# Patient Record
Sex: MALE | Race: WHITE | NOT HISPANIC OR LATINO | Employment: OTHER | URBAN - METROPOLITAN AREA
[De-identification: names, ages, dates, MRNs, and addresses within clinical notes are randomized per-mention and may not be internally consistent; named-entity substitution may affect disease eponyms.]

---

## 2021-04-13 PROBLEM — H90.3 SENSORINEURAL HEARING LOSS (SNHL) OF BOTH EARS: Status: ACTIVE | Noted: 2021-04-13

## 2021-04-13 PROBLEM — H61.22 CERUMEN DEBRIS ON TYMPANIC MEMBRANE OF LEFT EAR: Status: ACTIVE | Noted: 2021-04-13

## 2021-07-29 ENCOUNTER — APPOINTMENT (EMERGENCY)
Dept: RADIOLOGY | Facility: HOSPITAL | Age: 66
DRG: 246 | End: 2021-07-29
Payer: MEDICARE

## 2021-07-29 ENCOUNTER — APPOINTMENT (INPATIENT)
Dept: NON INVASIVE DIAGNOSTICS | Facility: HOSPITAL | Age: 66
DRG: 246 | End: 2021-07-29
Payer: MEDICARE

## 2021-07-29 ENCOUNTER — APPOINTMENT (INPATIENT)
Dept: RADIOLOGY | Facility: HOSPITAL | Age: 66
DRG: 246 | End: 2021-07-29
Payer: MEDICARE

## 2021-07-29 ENCOUNTER — HOSPITAL ENCOUNTER (INPATIENT)
Facility: HOSPITAL | Age: 66
LOS: 5 days | Discharge: HOME | DRG: 246 | End: 2021-08-03
Attending: EMERGENCY MEDICINE | Admitting: EMERGENCY MEDICINE
Payer: MEDICARE

## 2021-07-29 DIAGNOSIS — I48.0 PAROXYSMAL ATRIAL FIBRILLATION (HCC): ICD-10-CM

## 2021-07-29 DIAGNOSIS — I46.9 CARDIAC ARREST (HCC): Primary | ICD-10-CM

## 2021-07-29 LAB
ALBUMIN SERPL BCP-MCNC: 3.6 G/DL (ref 3.5–5)
ALBUMIN SERPL BCP-MCNC: 3.7 G/DL (ref 3.5–5)
ALP SERPL-CCNC: 70 U/L (ref 46–116)
ALP SERPL-CCNC: 72 U/L (ref 46–116)
ALT SERPL W P-5'-P-CCNC: 77 U/L (ref 12–78)
ALT SERPL W P-5'-P-CCNC: 86 U/L (ref 12–78)
ANION GAP SERPL CALCULATED.3IONS-SCNC: 11 MMOL/L (ref 4–13)
ANION GAP SERPL CALCULATED.3IONS-SCNC: 9 MMOL/L (ref 4–13)
APTT PPP: 24 SECONDS (ref 23–37)
APTT PPP: 27 SECONDS (ref 23–37)
AST SERPL W P-5'-P-CCNC: 55 U/L (ref 5–45)
AST SERPL W P-5'-P-CCNC: 75 U/L (ref 5–45)
ATRIAL RATE: 153 BPM
BASOPHILS # BLD AUTO: 0.05 THOUSANDS/ΜL (ref 0–0.1)
BASOPHILS NFR BLD AUTO: 1 % (ref 0–1)
BILIRUB SERPL-MCNC: 0.47 MG/DL (ref 0.2–1)
BILIRUB SERPL-MCNC: 0.52 MG/DL (ref 0.2–1)
BUN SERPL-MCNC: 22 MG/DL (ref 5–25)
BUN SERPL-MCNC: 25 MG/DL (ref 5–25)
CA-I BLD-SCNC: 1.17 MMOL/L (ref 1.12–1.32)
CALCIUM SERPL-MCNC: 9 MG/DL (ref 8.3–10.1)
CALCIUM SERPL-MCNC: 9.2 MG/DL (ref 8.3–10.1)
CHLORIDE SERPL-SCNC: 104 MMOL/L (ref 100–108)
CHLORIDE SERPL-SCNC: 105 MMOL/L (ref 100–108)
CHOLEST SERPL-MCNC: 156 MG/DL (ref 50–200)
CO2 SERPL-SCNC: 25 MMOL/L (ref 21–32)
CO2 SERPL-SCNC: 25 MMOL/L (ref 21–32)
CREAT SERPL-MCNC: 1.27 MG/DL (ref 0.6–1.3)
CREAT SERPL-MCNC: 1.53 MG/DL (ref 0.6–1.3)
EOSINOPHIL # BLD AUTO: 0.23 THOUSAND/ΜL (ref 0–0.61)
EOSINOPHIL NFR BLD AUTO: 2 % (ref 0–6)
ERYTHROCYTE [DISTWIDTH] IN BLOOD BY AUTOMATED COUNT: 13.3 % (ref 11.6–15.1)
EST. AVERAGE GLUCOSE BLD GHB EST-MCNC: 171 MG/DL
GFR SERPL CREATININE-BSD FRML MDRD: 46 ML/MIN/1.73SQ M
GFR SERPL CREATININE-BSD FRML MDRD: 58 ML/MIN/1.73SQ M
GLUCOSE SERPL-MCNC: 198 MG/DL (ref 65–140)
GLUCOSE SERPL-MCNC: 246 MG/DL (ref 65–140)
HBA1C MFR BLD: 7.6 %
HCT VFR BLD AUTO: 40 % (ref 36.5–49.3)
HDLC SERPL-MCNC: 36 MG/DL
HGB BLD-MCNC: 13.1 G/DL (ref 12–17)
IMM GRANULOCYTES # BLD AUTO: 0.05 THOUSAND/UL (ref 0–0.2)
IMM GRANULOCYTES NFR BLD AUTO: 1 % (ref 0–2)
INR PPP: 0.96 (ref 0.84–1.19)
INR PPP: 1.04 (ref 0.84–1.19)
LACTATE SERPL-SCNC: 2.9 MMOL/L (ref 0.5–2)
LACTATE SERPL-SCNC: 3.4 MMOL/L (ref 0.5–2)
LACTATE SERPL-SCNC: 5.1 MMOL/L (ref 0.5–2)
LDLC SERPL DIRECT ASSAY-MCNC: 75 MG/DL (ref 0–100)
LYMPHOCYTES # BLD AUTO: 3.44 THOUSANDS/ΜL (ref 0.6–4.47)
LYMPHOCYTES NFR BLD AUTO: 35 % (ref 14–44)
MAGNESIUM SERPL-MCNC: 2.1 MG/DL (ref 1.6–2.6)
MAGNESIUM SERPL-MCNC: 2.1 MG/DL (ref 1.6–2.6)
MCH RBC QN AUTO: 28.4 PG (ref 26.8–34.3)
MCHC RBC AUTO-ENTMCNC: 32.8 G/DL (ref 31.4–37.4)
MCV RBC AUTO: 87 FL (ref 82–98)
MONOCYTES # BLD AUTO: 0.97 THOUSAND/ΜL (ref 0.17–1.22)
MONOCYTES NFR BLD AUTO: 10 % (ref 4–12)
NEUTROPHILS # BLD AUTO: 5.24 THOUSANDS/ΜL (ref 1.85–7.62)
NEUTS SEG NFR BLD AUTO: 51 % (ref 43–75)
NRBC BLD AUTO-RTO: 0 /100 WBCS
PHOSPHATE SERPL-MCNC: 3.4 MG/DL (ref 2.3–4.1)
PLATELET # BLD AUTO: 262 THOUSANDS/UL (ref 149–390)
PMV BLD AUTO: 9.7 FL (ref 8.9–12.7)
POTASSIUM SERPL-SCNC: 3.3 MMOL/L (ref 3.5–5.3)
POTASSIUM SERPL-SCNC: 4 MMOL/L (ref 3.5–5.3)
PROT SERPL-MCNC: 7 G/DL (ref 6.4–8.2)
PROT SERPL-MCNC: 7.2 G/DL (ref 6.4–8.2)
PROTHROMBIN TIME: 12.8 SECONDS (ref 11.6–14.5)
PROTHROMBIN TIME: 13.6 SECONDS (ref 11.6–14.5)
QRS AXIS: 56 DEGREES
QRSD INTERVAL: 92 MS
QT INTERVAL: 368 MS
QTC INTERVAL: 455 MS
RBC # BLD AUTO: 4.61 MILLION/UL (ref 3.88–5.62)
SODIUM SERPL-SCNC: 139 MMOL/L (ref 136–145)
SODIUM SERPL-SCNC: 140 MMOL/L (ref 136–145)
T WAVE AXIS: 34 DEGREES
TRIGL SERPL-MCNC: 410 MG/DL
TROPONIN I SERPL-MCNC: 0.05 NG/ML
TROPONIN I SERPL-MCNC: 0.09 NG/ML
TROPONIN I SERPL-MCNC: <0.02 NG/ML
TSH SERPL DL<=0.05 MIU/L-ACNC: 1.41 UIU/ML (ref 0.36–3.74)
VENTRICULAR RATE: 92 BPM
WBC # BLD AUTO: 9.98 THOUSAND/UL (ref 4.31–10.16)

## 2021-07-29 PROCEDURE — 71275 CT ANGIOGRAPHY CHEST: CPT

## 2021-07-29 PROCEDURE — 71045 X-RAY EXAM CHEST 1 VIEW: CPT

## 2021-07-29 PROCEDURE — 85730 THROMBOPLASTIN TIME PARTIAL: CPT | Performed by: NURSE PRACTITIONER

## 2021-07-29 PROCEDURE — 99291 CRITICAL CARE FIRST HOUR: CPT

## 2021-07-29 PROCEDURE — 83605 ASSAY OF LACTIC ACID: CPT | Performed by: EMERGENCY MEDICINE

## 2021-07-29 PROCEDURE — 84443 ASSAY THYROID STIM HORMONE: CPT | Performed by: PHYSICIAN ASSISTANT

## 2021-07-29 PROCEDURE — 83735 ASSAY OF MAGNESIUM: CPT | Performed by: PHYSICIAN ASSISTANT

## 2021-07-29 PROCEDURE — 85025 COMPLETE CBC W/AUTO DIFF WBC: CPT | Performed by: EMERGENCY MEDICINE

## 2021-07-29 PROCEDURE — 99223 1ST HOSP IP/OBS HIGH 75: CPT | Performed by: INTERNAL MEDICINE

## 2021-07-29 PROCEDURE — 84484 ASSAY OF TROPONIN QUANT: CPT | Performed by: EMERGENCY MEDICINE

## 2021-07-29 PROCEDURE — 85610 PROTHROMBIN TIME: CPT | Performed by: EMERGENCY MEDICINE

## 2021-07-29 PROCEDURE — 99291 CRITICAL CARE FIRST HOUR: CPT | Performed by: EMERGENCY MEDICINE

## 2021-07-29 PROCEDURE — 83735 ASSAY OF MAGNESIUM: CPT | Performed by: NURSE PRACTITIONER

## 2021-07-29 PROCEDURE — 99291 CRITICAL CARE FIRST HOUR: CPT | Performed by: PHYSICIAN ASSISTANT

## 2021-07-29 PROCEDURE — 85730 THROMBOPLASTIN TIME PARTIAL: CPT | Performed by: EMERGENCY MEDICINE

## 2021-07-29 PROCEDURE — 83721 ASSAY OF BLOOD LIPOPROTEIN: CPT | Performed by: NURSE PRACTITIONER

## 2021-07-29 PROCEDURE — 80053 COMPREHEN METABOLIC PANEL: CPT | Performed by: EMERGENCY MEDICINE

## 2021-07-29 PROCEDURE — 84484 ASSAY OF TROPONIN QUANT: CPT | Performed by: NURSE PRACTITIONER

## 2021-07-29 PROCEDURE — 80053 COMPREHEN METABOLIC PANEL: CPT | Performed by: PHYSICIAN ASSISTANT

## 2021-07-29 PROCEDURE — 99292 CRITICAL CARE ADDL 30 MIN: CPT | Performed by: ANESTHESIOLOGY

## 2021-07-29 PROCEDURE — 1123F ACP DISCUSS/DSCN MKR DOCD: CPT | Performed by: INTERNAL MEDICINE

## 2021-07-29 PROCEDURE — 36415 COLL VENOUS BLD VENIPUNCTURE: CPT | Performed by: EMERGENCY MEDICINE

## 2021-07-29 PROCEDURE — 80061 LIPID PANEL: CPT | Performed by: NURSE PRACTITIONER

## 2021-07-29 PROCEDURE — 93306 TTE W/DOPPLER COMPLETE: CPT

## 2021-07-29 PROCEDURE — 84100 ASSAY OF PHOSPHORUS: CPT | Performed by: PHYSICIAN ASSISTANT

## 2021-07-29 PROCEDURE — 85610 PROTHROMBIN TIME: CPT | Performed by: NURSE PRACTITIONER

## 2021-07-29 PROCEDURE — 83036 HEMOGLOBIN GLYCOSYLATED A1C: CPT | Performed by: NURSE PRACTITIONER

## 2021-07-29 PROCEDURE — 93005 ELECTROCARDIOGRAM TRACING: CPT

## 2021-07-29 PROCEDURE — 82330 ASSAY OF CALCIUM: CPT | Performed by: PHYSICIAN ASSISTANT

## 2021-07-29 PROCEDURE — 93010 ELECTROCARDIOGRAM REPORT: CPT | Performed by: INTERNAL MEDICINE

## 2021-07-29 PROCEDURE — 83605 ASSAY OF LACTIC ACID: CPT | Performed by: PHYSICIAN ASSISTANT

## 2021-07-29 RX ORDER — PANTOPRAZOLE SODIUM 40 MG/1
40 TABLET, DELAYED RELEASE ORAL
Status: DISCONTINUED | OUTPATIENT
Start: 2021-07-30 | End: 2021-08-03 | Stop reason: HOSPADM

## 2021-07-29 RX ORDER — ASPIRIN 81 MG/1
81 TABLET ORAL DAILY
Status: DISCONTINUED | OUTPATIENT
Start: 2021-07-30 | End: 2021-07-30

## 2021-07-29 RX ORDER — LIDOCAINE 50 MG/G
1 PATCH TOPICAL DAILY
Status: DISCONTINUED | OUTPATIENT
Start: 2021-07-29 | End: 2021-08-03 | Stop reason: HOSPADM

## 2021-07-29 RX ORDER — LANOLIN ALCOHOL/MO/W.PET/CERES
6 CREAM (GRAM) TOPICAL
Status: DISCONTINUED | OUTPATIENT
Start: 2021-07-29 | End: 2021-08-03 | Stop reason: HOSPADM

## 2021-07-29 RX ORDER — POTASSIUM CHLORIDE 14.9 MG/ML
20 INJECTION INTRAVENOUS ONCE
Status: COMPLETED | OUTPATIENT
Start: 2021-07-29 | End: 2021-07-30

## 2021-07-29 RX ORDER — HEPARIN SODIUM 1000 [USP'U]/ML
2000 INJECTION, SOLUTION INTRAVENOUS; SUBCUTANEOUS
Status: DISCONTINUED | OUTPATIENT
Start: 2021-07-29 | End: 2021-07-30

## 2021-07-29 RX ORDER — POTASSIUM CHLORIDE 20 MEQ/1
20 TABLET, EXTENDED RELEASE ORAL ONCE
Status: COMPLETED | OUTPATIENT
Start: 2021-07-29 | End: 2021-07-29

## 2021-07-29 RX ORDER — LIDOCAINE 50 MG/G
1 PATCH TOPICAL DAILY
Status: DISCONTINUED | OUTPATIENT
Start: 2021-07-30 | End: 2021-07-29

## 2021-07-29 RX ORDER — HEPARIN SODIUM 10000 [USP'U]/100ML
3-20 INJECTION, SOLUTION INTRAVENOUS
Status: DISCONTINUED | OUTPATIENT
Start: 2021-07-29 | End: 2021-07-30

## 2021-07-29 RX ORDER — HEPARIN SODIUM 1000 [USP'U]/ML
4000 INJECTION, SOLUTION INTRAVENOUS; SUBCUTANEOUS
Status: DISCONTINUED | OUTPATIENT
Start: 2021-07-29 | End: 2021-07-30

## 2021-07-29 RX ADMIN — POTASSIUM CHLORIDE 20 MEQ: 1500 TABLET, EXTENDED RELEASE ORAL at 20:02

## 2021-07-29 RX ADMIN — Medication 12.5 MG: at 20:56

## 2021-07-29 RX ADMIN — HEPARIN SODIUM 11.1 UNITS/KG/HR: 10000 INJECTION, SOLUTION INTRAVENOUS at 19:02

## 2021-07-29 RX ADMIN — POTASSIUM CHLORIDE 20 MEQ: 14.9 INJECTION, SOLUTION INTRAVENOUS at 20:02

## 2021-07-29 RX ADMIN — IOHEXOL 85 ML: 350 INJECTION, SOLUTION INTRAVENOUS at 18:42

## 2021-07-29 RX ADMIN — LIDOCAINE 1 PATCH: 50 PATCH TOPICAL at 21:58

## 2021-07-29 NOTE — H&P
H&P Exam - Critical Care   Michael Ferris 77 y o  male MRN: [id number]  Unit/Bed#: ED 12 Encounter: [id number]      -------------------------------------------------------------------------------------------------------------  Chief Complaint: cardiac arrest     History of Present Illness   HX and PE limited by: none   Michael Ferris is a 77 y o  male with a medical history of hypertension hypercholesteremia and diabetes presenting status post cardiac arrest, patient is active and fit was at the fitness center on a rowing machine and became dizzy and then had a syncopal episode  Bystander which 1 of them was a medical professional did not find any pulses started CPR and had 4 rounds of CPR and AED was placed and patient was defibrillated he had ROSC  He was alert and speaking at that time, patient arrived in the ER and per ER note he was cool and clammy in gray continue complaining of lightheadedness  Patient was placed on nasal cannula 2 L, blood pressure was 129/66 with a pulse of 94 which was AFib  Cardiology was immediately consult, his labs were significant for low potassium, an ROSIBEL with a creatinine of 1 53, an elevated glucose at 249  Mildly elevated liver enzyme at 75 an 86 a lactic of 5 1 with a repeat of 3 4 a negative troponin and unremarkable CBC  History obtained from the patient   -------------------------------------------------------------------------------------------------------------  Assessment and Plan:    Neuro:   · trend neuro checks     · Sleep/wake cycle regulation as able   · Melatonin 6mg qHS  · CAM-ICU daily   · Analgesia- Tylenol p r n   For pain, lidocaine patch to fractured ribs  · Oxy IR 5 mg q 4 hours for severe pain            CV:  Cardiac arrest with ROSC, a fibrillation new onset, hypertension hyperlipidemia   Hold home medications for now   Started on metoprolol, aspirin 81 mg daily   Started on heparin drip   Echocardiogram is pending   Troponin q 3 hours times 3   Maintain MAP >65   Critical care monitoring   Optimize electrolytes magnesium calcium phosphorus and potassium   Cardiac arrest possibly cause arrhythmia vs ischemia    Pulm: acute resp insufficiency    Wean nasal cannula   Maintain SpO2 >92%    Continue pulmonary hygiene  Incentive spirometer q1h while awake, encourage coughing and deep breathing  Upright positioning      GI:  No issues     Stress ulcer prophylaxis: Pantoprazole PO   Bowel regimen:  None currently      :  No issues     Strict q2h I/O monitoring   Monitor Creatine / BUN    Creatinine 1 2          F/E/N:    Replete electrolytes with as needed to maintain K >4 0, Mag >2 0, Phos >3 0   Nutrition: npo   Maintenance fluids : isolyte 50 and hour           Heme/Onc:    Transfuse for hemoglobin <7 0    Transfuse for plts <15,000 or < 50,000 in the presence of bleeding    VTE prophylaxis:  Heparin drip, SCD's to BLE    Endo:  Type 2 diabetes   Insulin sliding scale   Adjust insulin regimen as needed to maintain goal -180  Lab Results   Component Value Date    HGBA1C 7 8 (H) 02/24/2021     ID:      Procalcitonin : No components found for: PROCAL   Monitor WBC / fever curve       MSK/Skin:    Reposition q2h, eliminate pressure points while in bed   Close skin surveillance     Disposition: Continue Critical Care   Code Status: No Order  --------------------------------------------------------------------------------------------------------------  Review of Systems   Constitutional: Negative for appetite change, diaphoresis, fatigue and fever  HENT: Negative for congestion, facial swelling, rhinorrhea, sneezing and tinnitus  Eyes: Negative for photophobia, pain and discharge  Respiratory: Negative for apnea, cough and shortness of breath  Cardiovascular: Negative for chest pain and leg swelling  Gastrointestinal: Positive for vomiting  Negative for abdominal distention, abdominal pain, diarrhea and nausea  Endocrine: Negative for cold intolerance, polydipsia and polyphagia  Genitourinary: Negative for enuresis, frequency and hematuria  Musculoskeletal: Negative for arthralgias, gait problem and myalgias  Skin: Negative for color change and rash  Allergic/Immunologic: Negative for environmental allergies and food allergies  Neurological: Positive for dizziness, syncope and light-headedness  Negative for speech difficulty and headaches  Hematological: Negative for adenopathy  Does not bruise/bleed easily  Psychiatric/Behavioral: Negative for behavioral problems, decreased concentration and hallucinations  The patient is not hyperactive  A 12-point, complete review of systems was reviewed and negative except as stated above     Physical Exam  Vitals and nursing note reviewed  Constitutional:       Appearance: Normal appearance  He is well-developed and normal weight  HENT:      Head: Normocephalic and atraumatic  Eyes:      Pupils: Pupils are equal, round, and reactive to light  Cardiovascular:      Rate and Rhythm: Normal rate and regular rhythm  Pulses: Normal pulses  Heart sounds: Normal heart sounds  No murmur heard  Pulmonary:      Effort: Pulmonary effort is normal       Breath sounds: Normal breath sounds  No rhonchi or rales  Abdominal:      General: Bowel sounds are normal  There is no distension  Palpations: Abdomen is soft  Tenderness: There is no abdominal tenderness  There is no guarding  Genitourinary:     Penis: Normal     Musculoskeletal:         General: Normal range of motion  Cervical back: Normal range of motion and neck supple  Right lower leg: No edema  Left lower leg: No edema  Skin:     General: Skin is warm and dry  Capillary Refill: Capillary refill takes less than 2 seconds  Neurological:      Mental Status: He is alert and oriented to person, place, and time     Psychiatric:         Behavior: Behavior normal  --------------------------------------------------------------------------------------------------------------  Vitals:   Vitals:    07/29/21 1600 07/29/21 1630 07/29/21 1715 07/29/21 1800   BP: 127/64 122/59 142/68 122/58   BP Location: Right arm Right arm Right arm    Pulse: 92 96 98 94   Resp: 16 20 18 16   Temp:       TempSrc:       SpO2: 95% 98% 98% 100%   Weight:         Temp  Min: 97 4 °F (36 3 °C)  Max: 97 4 °F (36 3 °C)        Body mass index is 26 39 kg/m²  N/A    Laboratory and Diagnostics:  Results from last 7 days   Lab Units 07/29/21  1513   WBC Thousand/uL 9 98   HEMOGLOBIN g/dL 13 1   HEMATOCRIT % 40 0   PLATELETS Thousands/uL 262   NEUTROS PCT % 51   MONOS PCT % 10     Results from last 7 days   Lab Units 07/29/21  1513   SODIUM mmol/L 140   POTASSIUM mmol/L 3 3*   CHLORIDE mmol/L 104   CO2 mmol/L 25   ANION GAP mmol/L 11   BUN mg/dL 25   CREATININE mg/dL 1 53*   CALCIUM mg/dL 9 2   GLUCOSE RANDOM mg/dL 246*   ALT U/L 86*   AST U/L 75*   ALK PHOS U/L 72   ALBUMIN g/dL 3 7   TOTAL BILIRUBIN mg/dL 0 52          Results from last 7 days   Lab Units 07/29/21  1516   INR  0 96   PTT seconds 24      Results from last 7 days   Lab Units 07/29/21  1513   TROPONIN I ng/mL <0 02     Results from last 7 days   Lab Units 07/29/21  1724 07/29/21  1516   LACTIC ACID mmol/L 3 4* 5 1*     ABG:    VBG:          Micro:        EKG:  A fibrillation at a controlled rate between 80-90  Imaging: I have personally reviewed pertinent reports        Historical Information   Past Medical History:   Diagnosis Date    BPPV (benign paroxysmal positional vertigo)     vestibular    Ear problems     fungal infections     Past Surgical History:   Procedure Laterality Date    BUNIONECTOMY Right 2006    BUNIONECTOMY  2008     Social History   Social History     Substance and Sexual Activity   Alcohol Use None     Social History     Substance and Sexual Activity   Drug Use Never     Social History     Tobacco Use   Smoking Status Never Smoker   Smokeless Tobacco Never Used     Exercise History: *n/a  Family History:   History reviewed  No pertinent family history  I have reviewed this patient's family history and commented on sigificant items within the HPI      Medications:  Current Facility-Administered Medications   Medication Dose Route Frequency    [START ON 7/30/2021] aspirin (ECOTRIN LOW STRENGTH) EC tablet 81 mg  81 mg Oral Daily    heparin (porcine) 25,000 units in 0 45% NaCl 250 mL infusion (premix)  3-20 Units/kg/hr (Order-Specific) Intravenous Titrated    heparin (porcine) injection 2,000 Units  2,000 Units Intravenous Q1H PRN    heparin (porcine) injection 4,000 Units  4,000 Units Intravenous Q1H PRN    metoprolol tartrate (LOPRESSOR) partial tablet 12 5 mg  12 5 mg Oral Q12H [facility]    potassium chloride (K-DUR,KLOR-CON) CR tablet 20 mEq  20 mEq Oral Once    potassium chloride 20 mEq IVPB (premix)  20 mEq Intravenous Once     Home medications:  Prior to Admission Medications   Prescriptions Last Dose Informant Patient Reported? Taking? DULoxetine (CYMBALTA) 60 mg delayed release capsule   Yes No   MECLIZINE HCL PO   Yes No   Sig: Take by mouth   MOMETASONE FUROATE NA   Yes No   Sig: Administer into the left ear Itchy ears   Methylcobalamin (B12-ACTIVE PO)   Yes No   Sig: Take by mouth   Onglyza 5 MG tablet   Yes No   VITAMIN D PO   Yes No   Sig: Take by mouth   aspirin (ECOTRIN LOW STRENGTH) 81 mg EC tablet   Yes No   Sig: Take 81 mg by mouth daily   atenolol-chlorthalidone (TENORETIC) 50-25 mg per tablet   Yes No   Sig: Take 1 tablet by mouth daily   atorvastatin (LIPITOR) 40 mg tablet   Yes No   Sig: Take 40 mg by mouth every evening   loratadine (CLARITIN) 10 mg tablet   Yes No   Sig: Take 10 mg by mouth daily   metFORMIN (GLUCOPHAGE) 500 mg tablet   Yes No   Sig: Take 500 mg by mouth 2 (two) times a day with meals      Facility-Administered Medications: None     Allergies:   Allergies   Allergen Reactions    Codeine Rash     ------------------------------------------------------------------------------------------------------------  Advance Directive and Living Will:      Power of :    POLST:    ------------------------------------------------------------------------------------------------------------  Anticipated Length of Stay is > 2 midnights    Care Time Delivered:   Upon my evaluation, this patient had a high probability of imminent or life-threatening deterioration due to Status post cardiac arrest most like May arrhythmic, which required my direct attention, intervention, and personal management  I have personally provided 45 minutes (1930 to 2015) of critical care time, exclusive of procedures, teaching, family meetings, and any prior time recorded by providers other than myself  Lois Lilly PA-C        Portions of the record may have been created with voice recognition software  Occasional wrong word or "sound a like" substitutions may have occurred due to the inherent limitations of voice recognition software    Read the chart carefully and recognize, using context, where substitutions have occurred

## 2021-07-29 NOTE — ED PROVIDER NOTES
History  Chief Complaint   Patient presents with    Cardiac Arrest     Pt post cardiac arrest from fitness center  4 rounds of cpr and one shock from AED  Pt is a/o x4  Pt is c/o chest pain and feels diaphoretic     Patient is a 58-year-old male past medical history significant for hypertension, diabetes, who presents by EMS after cardiac arrest   Patient was at a local fitness center, had just finished his work out, when he began to feel dizzy and then lost consciousness  Bystanders responded and found that he did not have pulses  Four rounds of CPR were administered and 1 shock was delivered by a ED  Patient then regained pulses  No medication or respiratory systems was needed by EMS  Patient is currently awake and alert and is complaining only of lightheadedness  He denies chest pain, shortness of breath, recent leg pain or swelling, abdominal pain, back pain, pain anywhere else, or any other specific complaints at this time though full review of systems limited secondary to acuity of the condition  History provided by:  EMS personnel, medical records and patient   used: No    Cardiac Arrest      Prior to Admission Medications   Prescriptions Last Dose Informant Patient Reported? Taking?    DULoxetine (CYMBALTA) 60 mg delayed release capsule   Yes No   MECLIZINE HCL PO   Yes No   Sig: Take by mouth   MOMETASONE FUROATE NA   Yes No   Sig: Administer into the left ear Itchy ears   Methylcobalamin (B12-ACTIVE PO)   Yes No   Sig: Take by mouth   Onglyza 5 MG tablet   Yes No   VITAMIN D PO   Yes No   Sig: Take by mouth   aspirin (ECOTRIN LOW STRENGTH) 81 mg EC tablet   Yes No   Sig: Take 81 mg by mouth daily   atenolol-chlorthalidone (TENORETIC) 50-25 mg per tablet   Yes No   Sig: Take 1 tablet by mouth daily   atorvastatin (LIPITOR) 40 mg tablet   Yes No   Sig: Take 40 mg by mouth every evening   loratadine (CLARITIN) 10 mg tablet   Yes No   Sig: Take 10 mg by mouth daily   metFORMIN (GLUCOPHAGE) 500 mg tablet   Yes No   Sig: Take 500 mg by mouth 2 (two) times a day with meals      Facility-Administered Medications: None       Past Medical History:   Diagnosis Date    BPPV (benign paroxysmal positional vertigo)     vestibular    Ear problems     fungal infections       Past Surgical History:   Procedure Laterality Date    BUNIONECTOMY Right 2006    BUNIONECTOMY  2008       History reviewed  No pertinent family history  I have reviewed and agree with the history as documented  E-Cigarette/Vaping     E-Cigarette/Vaping Substances     Social History     Tobacco Use    Smoking status: Never Smoker    Smokeless tobacco: Never Used   Substance Use Topics    Alcohol use: Not on file    Drug use: Never        Review of Systems   Unable to perform ROS: Acuity of condition       Physical Exam  ED Triage Vitals   Temperature Pulse Respirations Blood Pressure SpO2   07/29/21 1510 07/29/21 1507 07/29/21 1507 07/29/21 1507 07/29/21 1507   (!) 97 4 °F (36 3 °C) 94 20 129/66 96 %      Temp Source Heart Rate Source Patient Position - Orthostatic VS BP Location FiO2 (%)   07/29/21 1510 07/29/21 1507 07/29/21 1507 07/29/21 1507 --   Oral Monitor Lying Right arm       Pain Score       --                    Orthostatic Vital Signs  Vitals:    07/29/21 1507 07/29/21 1600 07/29/21 1630 07/29/21 1715   BP: 129/66 127/64 122/59 142/68   Pulse: 94 92 96 98   Patient Position - Orthostatic VS: Lying Lying Lying Lying       Physical Exam  Vitals and nursing note reviewed  Constitutional:       General: He is not in acute distress  Appearance: He is well-developed  He is diaphoretic  HENT:      Head: Normocephalic and atraumatic  Eyes:      General: No scleral icterus  Conjunctiva/sclera: Conjunctivae normal    Neck:      Vascular: No JVD  Trachea: No tracheal deviation  Cardiovascular:      Rate and Rhythm: Normal rate     Pulmonary:      Effort: Pulmonary effort is normal  No respiratory distress  Breath sounds: Normal breath sounds  Abdominal:      General: Abdomen is flat  There is no distension  Palpations: Abdomen is soft  Musculoskeletal:         General: No deformity  Cervical back: Neck supple  Right lower leg: No edema  Left lower leg: No edema  Skin:     General: Skin is warm  Neurological:      Mental Status: He is alert and oriented to person, place, and time  Comments: Moves all extremities   Psychiatric:         Behavior: Behavior normal          ED Medications  Medications   metoprolol tartrate (LOPRESSOR) partial tablet 12 5 mg (has no administration in time range)   aspirin (ECOTRIN LOW STRENGTH) EC tablet 81 mg (has no administration in time range)   potassium chloride 20 mEq IVPB (premix) (has no administration in time range)   potassium chloride (K-DUR,KLOR-CON) CR tablet 20 mEq (has no administration in time range)   heparin (porcine) 25,000 units in 0 45% NaCl 250 mL infusion (premix) (has no administration in time range)   heparin (porcine) injection 4,000 Units (has no administration in time range)   heparin (porcine) injection 2,000 Units (has no administration in time range)       Diagnostic Studies  Results Reviewed     Procedure Component Value Units Date/Time    Lactic acid 2 Hours [[id number]]  (Abnormal) Collected: 07/29/21 1724    Lab Status: Final result Specimen: Blood from Arm, Left Updated: 07/29/21 1819     LACTIC ACID 3 4 mmol/L     Narrative:      Result may be elevated if tourniquet was used during collection      Magnesium [[id number]]     Lab Status: No result Specimen: Blood     Hemoglobin A1C w/ EAG Estimation [[id number]]     Lab Status: No result Specimen: Blood     Lipid Panel with Direct LDL reflex [[id number]]     Lab Status: No result Specimen: Blood     Troponin I [[id number]]     Lab Status: No result Specimen: Blood     APTT six (6) hours after Heparin bolus/drip initiation or dosing change [[id number]]     Lab Status: No result Specimen: Blood     Protime-INR [[id number]]     Lab Status: No result Specimen: Blood     Lactic acid, plasma [[id number]]  (Abnormal) Collected: 07/29/21 1516    Lab Status: Final result Specimen: Blood from Arm, Right Updated: 07/29/21 1610     LACTIC ACID 5 1 mmol/L     Narrative:      Result may be elevated if tourniquet was used during collection      Protime-INR [[id number]]  (Normal) Collected: 07/29/21 1516    Lab Status: Final result Specimen: Blood from Arm, Right Updated: 07/29/21 1551     Protime 12 8 seconds      INR 0 96    APTT [[id number]]  (Normal) Collected: 07/29/21 1516    Lab Status: Final result Specimen: Blood from Arm, Right Updated: 07/29/21 1551     PTT 24 seconds     Comprehensive metabolic panel [[id number]]  (Abnormal) Collected: 07/29/21 1513    Lab Status: Final result Specimen: Blood from Arm, Right Updated: 07/29/21 1544     Sodium 140 mmol/L      Potassium 3 3 mmol/L      Chloride 104 mmol/L      CO2 25 mmol/L      ANION GAP 11 mmol/L      BUN 25 mg/dL      Creatinine 1 53 mg/dL      Glucose 246 mg/dL      Calcium 9 2 mg/dL      AST 75 U/L      ALT 86 U/L      Alkaline Phosphatase 72 U/L      Total Protein 7 2 g/dL      Albumin 3 7 g/dL      Total Bilirubin 0 52 mg/dL      eGFR 46 ml/min/1 73sq m     Narrative:      [place] guidelines for Chronic Kidney Disease (CKD):     Stage 1 with normal or high GFR (GFR > 90 mL/min/1 73 square meters)    Stage 2 Mild CKD (GFR = 60-89 mL/min/1 73 square meters)    Stage 3A Moderate CKD (GFR = 45-59 mL/min/1 73 square meters)    Stage 3B Moderate CKD (GFR = 30-44 mL/min/1 73 square meters)    Stage 4 Severe CKD (GFR = 15-29 mL/min/1 73 square meters)    Stage 5 End Stage CKD (GFR <15 mL/min/1 73 square meters)  Note: GFR calculation is accurate only with a steady state creatinine    Troponin I [[id number]]  (Normal) Collected: 07/29/21 1513    Lab Status: Final result Specimen: Blood from Arm, Right Updated: 07/29/21 1544     Troponin I <0 02 ng/mL     CBC and differential [[id number]] Collected: 07/29/21 1513    Lab Status: Final result Specimen: Blood from Arm, Right Updated: 07/29/21 1526     WBC 9 98 Thousand/uL      RBC 4 61 Million/uL      Hemoglobin 13 1 g/dL      Hematocrit 40 0 %      MCV 87 fL      MCH 28 4 pg      MCHC 32 8 g/dL      RDW 13 3 %      MPV 9 7 fL      Platelets 225 Thousands/uL      nRBC 0 /100 WBCs      Neutrophils Relative 51 %      Immat GRANS % 1 %      Lymphocytes Relative 35 %      Monocytes Relative 10 %      Eosinophils Relative 2 %      Basophils Relative 1 %      Neutrophils Absolute 5 24 Thousands/µL      Immature Grans Absolute 0 05 Thousand/uL      Lymphocytes Absolute 3 44 Thousands/µL      Monocytes Absolute 0 97 Thousand/µL      Eosinophils Absolute 0 23 Thousand/µL      Basophils Absolute 0 05 Thousands/µL                  XR chest 1 view portable    (Results Pending)   CTA ED chest PE Study    (Results Pending)         Procedures  Procedures      ED Course  ED Course as of Jul 29 1820   Thu Jul 29, 2021   1516 Procedure Note: EKG  Date/Time: 07/29/21 3:18 PM   Interpreted by: Zofia Yung DO  Indications / Diagnosis: post arrest  ECG reviewed by me, the ED Provider: yes   The EKG demonstrates:  Rhythm: afib, rate 92  Intervals: normal intervals  Axis: normal axis  QRS: normal QRS  ST Changes: No acute ST Changes, no STD/AIDE           1546 Creatinine(!): 1 53   1546 Glucose, Random(!): 246   1612 LACTIC ACID(!!): 5 1               Identification of Seniors at Risk      Most Recent Value   (ISAR) Identification of Seniors at Risk   Before the illness or injury that brought you to the Emergency, did you need someone to help you on a regular basis? 0 Filed at: 07/29/2021 1511   In the last 24 hours, have you needed more help than usual?  0 Filed at: 07/29/2021 1511   Have you been hospitalized for one or more nights during the past 6 months?   0 Filed at: 07/29/2021 1511   In general, do you see well?  0 Filed at: 07/29/2021 1511   In general, do you have serious problems with your memory? 0 Filed at: 07/29/2021 1511   Do you take more than three different medications every day? 1 Filed at: 07/29/2021 1511   ISAR Score  1 Filed at: 07/29/2021 1511                    SBIRT 22yo+      Most Recent Value   SBIRT (25 yo +)   In order to provide better care to our patients, we are screening all of our patients for alcohol and drug use  Would it be okay to ask you these screening questions? Unable to answer at this time Filed at: 07/29/2021 1512                MDM    Disposition  Final diagnoses:   Cardiac arrest [facility])     Time reflects when diagnosis was documented in both MDM as applicable and the Disposition within this note     Time User Action Codes Description Comment    7/29/2021  4:29 PM John Clayton Add [I46 9] Cardiac arrest [facility])       ED Disposition     ED Disposition Condition Date/Time Comment    Admit Stable Thu Jul 29, 2021  4:29 PM Case was discussed with Dr Lan Larsen and the patient's admission status was agreed to be Admission Status: inpatient status to the service of Dr Lan Larsen  Follow-up Information    None         Patient's Medications   Discharge Prescriptions    No medications on file     No discharge procedures on file  PDMP Review     None           ED Provider  Attending physically available and evaluated Pete Smyth  I managed the patient along with the ED Attending      Electronically Signed by         Lynette Andre DO  07/29/21 5321

## 2021-07-29 NOTE — ED ATTENDING ATTESTATION
7/29/2021  IArden MD, saw and evaluated the patient  I have discussed the patient with the resident/non-physician practitioner and agree with the resident's/non-physician practitioner's findings, Plan of Care, and MDM as documented in the resident's/non-physician practitioner's note, except where noted  All available labs and Radiology studies were reviewed  I was present for key portions of any procedure(s) performed by the resident/non-physician practitioner and I was immediately available to provide assistance  At this point I agree with the current assessment done in the Emergency Department  I have conducted an independent evaluation of this patient a history and physical is as follows:    77-year-old man with history of hypertension, hypercholesterolemia, diabetes presenting status post cardiac arrest   Patient was at a fitness center and had been rolling, became dizzy and then had syncopal episode  Bystanders including a medical professional did not find pulses and started CPR  Patient had 4 rounds of CPR  An AED was found and placed on the patient  Shock was advised and the patient was defibrillated once after which he had ROSC and woke up  On arrival to the emergency department he was gray and was clammy, complaining of continued lightheadedness  No chest pain or shortness of breath  No leg pain or swelling  No history of DVT or PE  No abdominal pain, nausea or vomiting  Patient had been feeling fine prior to the event  He does not have any known history of dysrhythmia  On initial examination he was awake and alert but ill-appearing and clammy  Head atraumatic normocephalic  Oropharynx clear  Neck is supple  Heart irregularly irregular, mildly tachycardic  No murmurs rubs or gallops  Lungs clear to auscultation bilaterally with no respiratory distress  Abdomen soft, nontender, nondistended  No focal neuro deficit    The patient was monitored very closely and re-evaluated multiple times in the emergency department  His lightheadedness resolved and his skin color became much better  Workup significant for elevated lactate, mild ROSIBEL and transaminitis, likely due to hypoperfusion during the cardiac arrest   Cardiology consulted  Patient will be admitted to the intensive care unit  I had extensive discussion with the patient and his family      ED Course         Critical Care Time  CriticalCare Time  Performed by: Edson Espinoza MD  Authorized by: Edson Espinoza MD     Critical care provider statement:     Critical care time (minutes):  35    Critical care time was exclusive of:  Separately billable procedures and treating other patients and teaching time    Critical care was necessary to treat or prevent imminent or life-threatening deterioration of the following conditions:  Cardiac failure and circulatory failure    Critical care was time spent personally by me on the following activities:  Obtaining history from patient or surrogate, development of treatment plan with patient or surrogate, discussions with consultants, evaluation of patient's response to treatment, examination of patient, interpretation of cardiac output measurements, ordering and performing treatments and interventions, ordering and review of laboratory studies, ordering and review of radiographic studies and re-evaluation of patient's condition    I assumed direction of critical care for this patient from another provider in my specialty: no

## 2021-07-29 NOTE — CONSULTS
Consultation - General Cardiology Team 2  Michael Ferris 77 y o  male MRN: [id number]  Unit/Bed#: ED 12 Encounter: [id number]      Consults  PCP: No primary care provider on file  History of Present Illness   Physician Requesting Consult: Marian Florence MD  Reason for Consult / Principal Problem: Cardiac Arrest    HPI: Michael Ferris is a 77y o  year old male with a history of HTN, HLD, DM2 (A1c 7 8%) who presents to the ED after a Cardiac Arrest  Patient states that he has been in his usual state of health, is extremely active, goes on long hikes, works out 3-4x a week (vigorously) and otherwise has no physical limitations  Patient states that he was at the gym today, had about a 1 5 hour work out where he was pushing himself, towards the end of his work out he was doing the row machine and after 20 minutes he suddenly felt lightheaded and dizzy, he subsequently passed out before bystanders arrived  He denies any CP or palpitations prior to the incident  Bystanders felt he had no pulse and began CPR, he underwent 4 rounds and an AED was placed which advised shock  It is unclear what rhythm was seen during this time  Patient was shocked x1 before he achieved ROSC  Patient arrived to the ED and EKG was notable for Atrial Fibrillation (patient denies a history of this prior to present episode)  Patient was hemodynamically stable with an initial BP of 129/66, HR in 90s, Lactic Acid 5 1 with a slight ROSIBEL of 1 5  Initial troponin negative  Potassium 3 3  Upon further history, patient denies any family history of SCD or channelopathys that he is aware of  No CAD  His mother and sister both have atrial fibrillation  Mother passed at age 80  Patient denies the use of tobacco (stopped smoking >40 years ago)  Denies alcohol or drug use  No herbal remedies or supplements  States he has not noticed any viral symptoms to his knowledge  Got the final dose of the COVID19 vaccine in February 2021        Review of Systems  ROS as noted above  Historical Information   Past Medical History:   Diagnosis Date    BPPV (benign paroxysmal positional vertigo)     vestibular    Ear problems     fungal infections     Past Surgical History:   Procedure Laterality Date    BUNIONECTOMY Right 2006    BUNIONECTOMY  2008     Social History     Substance and Sexual Activity   Alcohol Use None     Social History     Substance and Sexual Activity   Drug Use Never     Social History     Tobacco Use   Smoking Status Never Smoker   Smokeless Tobacco Never Used     Family History: History reviewed  No pertinent family history  Meds/Allergies   Hospital Medications:   No current facility-administered medications for this encounter  Home Medications: (Not in a hospital admission)      Allergies   Allergen Reactions    Codeine Rash       Objective   Vitals: Blood pressure 127/64, pulse 92, temperature (!) 97 4 °F (36 3 °C), temperature source Oral, resp  rate 16, weight 90 7 kg (200 lb), SpO2 95 %    Orthostatic Blood Pressures      Most Recent Value   Blood Pressure  127/64 filed at 07/29/2021 1600   Patient Position - Orthostatic VS  Lying filed at 07/29/2021 1600            Invasive Devices     Peripheral Intravenous Line            Peripheral IV 07/29/21 Left Antecubital <1 day    Peripheral IV 07/29/21 Right Antecubital <1 day                Physical Exam  GEN: Ana Palm Hyduke appears well, alert and oriented x 3, pleasant and cooperative   HEENT:  Normocephalic, atraumatic, anicteric, moist mucous membranes  NECK: No JVD or carotid bruits   HEART: irreg rhythm, irreg rate, normal S1 and S2, no murmurs, clicks, gallops or rubs   LUNGS: Clear to auscultation bilaterally; no wheezes, rales, or rhonchi; respiration nonlabored   ABDOMEN:  Normoactive bowel sounds, soft, no tenderness, no distention  EXTREMITIES: peripheral pulses palpable; no edema  NEURO: no gross focal findings; cranial nerves grossly intact   SKIN:  Dry, intact, warm to touch    Lab Results:   CBC with diff:   Results from last 7 days   Lab Units 07/29/21  1513   WBC Thousand/uL 9 98   RBC Million/uL 4 61   HEMOGLOBIN g/dL 13 1   HEMATOCRIT % 40 0   MCV fL 87   MCH pg 28 4   MCHC g/dL 32 8   RDW % 13 3   MPV fL 9 7   PLATELETS Thousands/uL 262     CMP:   Results from last 7 days   Lab Units 07/29/21  1513   SODIUM mmol/L 140   POTASSIUM mmol/L 3 3*   CHLORIDE mmol/L 104   CO2 mmol/L 25   BUN mg/dL 25   CREATININE mg/dL 1 53*   CALCIUM mg/dL 9 2   AST U/L 75*   ALT U/L 86*   ALK PHOS U/L 72   EGFR ml/min/1 73sq m 46     Troponin:   0   Lab Value Date/Time    TROPONINI <0 02 07/29/2021 1513     BNP:   Results from last 7 days   Lab Units 07/29/21  1513   POTASSIUM mmol/L 3 3*   CHLORIDE mmol/L 104   CO2 mmol/L 25   BUN mg/dL 25   CREATININE mg/dL 1 53*   CALCIUM mg/dL 9 2   EGFR ml/min/1 73sq m 46     Coags:   Results from last 7 days   Lab Units 07/29/21  1516   PTT seconds 24   INR  0 96     TSH:     Magnesium:     Lipid Profile:     Results from last 7 days   Lab Units 07/29/21  1513   TROPONIN I ng/mL <0 02     Results from last 7 days   Lab Units 07/29/21  1513   POTASSIUM mmol/L 3 3*   CO2 mmol/L 25   CHLORIDE mmol/L 104   BUN mg/dL 25   CREATININE mg/dL 1 53*     Results from last 7 days   Lab Units 07/29/21  1513   HEMOGLOBIN g/dL 13 1   HEMATOCRIT % 40 0   PLATELETS Thousands/uL 262     Results from last 7 days   Lab Units 07/29/21  1516   PTT seconds 24             Imaging: I have personally reviewed pertinent reports  Telemetry:   Atrial Fibrillation, rate controlled    EKG:   Date: 7/29/21  Interpretation: Atrial Fibrillation, No obvious ischemic changes  Previous STRESS TEST:  No results found for this or any previous visit  No results found for this or any previous visit  No results found for this or any previous visit  Previous Cath/PCI:  No results found for this or any previous visit        ECHO:  No results found for this or any previous visit  No results found for this or any previous visit  HOLTER  No results found for this or any previous visit  VTE Prophylaxis: Heparin       Assessment/Plan     Assessment:    1  Cardiac Arrest, Unclear Etiology   --> Does not appear to be ischemic in nature based on patients description  He denies every feeling CP, palpitations, SOB  He is vigorously active daily and has no physical eliminations which would make CAD causing VF arrest unlikely  Patient denies any viral symptoms to his knowledge but possible myocarditis causing VF is on the differential, however I would suspect an elevated troponin to some degree  Patient could have possibly had a vagal episode due to his vigorous physical activity or atrial fibrillation with RVR causing a reduction in his cardiac output causing him to pass out  PE is certainly on the differential but dos not have traditional risk factors  2  Atrial Fibrillation, New Onset  --> [id number]: 3  3  HTN // HLD  4  DM2    Plan:  - f/u TTE  - f/u CTPE  - c/w Aspirin 81mg PO Daily  - c/w Lipitor 40mg PO QHS  - Start Metoprolol Tart  12 5mg PO BID  - Hold his Chlorthalidone given ROSIBEL  - Heparin ggt for AF with aPTT Q6H  --> Eventual transition to DOAC upon discharge  - Will consider cardioversion to obtain NSR given this is patients likely first episode  Unclear timeline of onset given patient denies feeling palpitations  Will likely require a EBONY prior to  Will continue to monitor clinical course  - Trend Troponin until down trending or negative x3  IF significant elevation will consider an ischemic work up  - Will consider MRI prior to d/c if other work up is unrevealing  - Will also consider ICD for secondary prevention if needed  - Continue with Telemetry    Case discussed and reviewed with Dr Eladia Miller who agrees with my assessment and plan  Thank you for involving us in the care of your patient        Nick Belle MD  Cardiology Fellow PGY-4    ==========================================================================================    Counseling / Coordination of Care  Total floor / unit time spent today 45 minutes minutes  Greater than 50% of total time was spent with the patient and / or family counseling and / or coordination of care  A description of the counseling / coordination of care:         Epic/ Allscripts/Care Everywhere records reviewed:     ** Please Note: Fluency DirectDictation voice to text software may have been used in the creation of this document   **

## 2021-07-30 ENCOUNTER — APPOINTMENT (INPATIENT)
Dept: NON INVASIVE DIAGNOSTICS | Facility: HOSPITAL | Age: 66
DRG: 246 | End: 2021-07-30
Payer: MEDICARE

## 2021-07-30 PROBLEM — I46.9 CARDIAC ARREST (HCC): Status: ACTIVE | Noted: 2021-07-30

## 2021-07-30 PROBLEM — I10 HYPERTENSION: Status: ACTIVE | Noted: 2021-07-30

## 2021-07-30 PROBLEM — I48.91 ATRIAL FIBRILLATION (HCC): Status: ACTIVE | Noted: 2021-07-30

## 2021-07-30 PROBLEM — E11.9 DM2 (DIABETES MELLITUS, TYPE 2) (HCC): Status: ACTIVE | Noted: 2021-07-30

## 2021-07-30 PROBLEM — E78.5 HYPERLIPIDEMIA: Status: ACTIVE | Noted: 2021-07-30

## 2021-07-30 LAB
ANION GAP SERPL CALCULATED.3IONS-SCNC: 7 MMOL/L (ref 4–13)
APTT PPP: 33 SECONDS (ref 23–37)
APTT PPP: 53 SECONDS (ref 23–37)
APTT PPP: 68 SECONDS (ref 23–37)
BASOPHILS # BLD AUTO: 0.04 THOUSANDS/ΜL (ref 0–0.1)
BASOPHILS NFR BLD AUTO: 0 % (ref 0–1)
BUN SERPL-MCNC: 23 MG/DL (ref 5–25)
CA-I BLD-SCNC: 1.17 MMOL/L (ref 1.12–1.32)
CALCIUM SERPL-MCNC: 8.9 MG/DL (ref 8.3–10.1)
CHLORIDE SERPL-SCNC: 105 MMOL/L (ref 100–108)
CO2 SERPL-SCNC: 28 MMOL/L (ref 21–32)
CREAT SERPL-MCNC: 1.06 MG/DL (ref 0.6–1.3)
EOSINOPHIL # BLD AUTO: 0.1 THOUSAND/ΜL (ref 0–0.61)
EOSINOPHIL NFR BLD AUTO: 1 % (ref 0–6)
ERYTHROCYTE [DISTWIDTH] IN BLOOD BY AUTOMATED COUNT: 13.6 % (ref 11.6–15.1)
GFR SERPL CREATININE-BSD FRML MDRD: 72 ML/MIN/1.73SQ M
GLUCOSE SERPL-MCNC: 165 MG/DL (ref 65–140)
HCT VFR BLD AUTO: 40.4 % (ref 36.5–49.3)
HGB BLD-MCNC: 13.2 G/DL (ref 12–17)
IMM GRANULOCYTES # BLD AUTO: 0.03 THOUSAND/UL (ref 0–0.2)
IMM GRANULOCYTES NFR BLD AUTO: 0 % (ref 0–2)
LACTATE SERPL-SCNC: 1.4 MMOL/L (ref 0.5–2)
LACTATE SERPL-SCNC: 1.6 MMOL/L (ref 0.5–2)
LYMPHOCYTES # BLD AUTO: 3.36 THOUSANDS/ΜL (ref 0.6–4.47)
LYMPHOCYTES NFR BLD AUTO: 32 % (ref 14–44)
MAGNESIUM SERPL-MCNC: 2.2 MG/DL (ref 1.6–2.6)
MCH RBC QN AUTO: 28.3 PG (ref 26.8–34.3)
MCHC RBC AUTO-ENTMCNC: 32.7 G/DL (ref 31.4–37.4)
MCV RBC AUTO: 87 FL (ref 82–98)
MONOCYTES # BLD AUTO: 0.95 THOUSAND/ΜL (ref 0.17–1.22)
MONOCYTES NFR BLD AUTO: 9 % (ref 4–12)
NEUTROPHILS # BLD AUTO: 6.2 THOUSANDS/ΜL (ref 1.85–7.62)
NEUTS SEG NFR BLD AUTO: 58 % (ref 43–75)
NRBC BLD AUTO-RTO: 0 /100 WBCS
PHOSPHATE SERPL-MCNC: 3.4 MG/DL (ref 2.3–4.1)
PLATELET # BLD AUTO: 225 THOUSANDS/UL (ref 149–390)
PMV BLD AUTO: 9.5 FL (ref 8.9–12.7)
POTASSIUM SERPL-SCNC: 3.5 MMOL/L (ref 3.5–5.3)
RBC # BLD AUTO: 4.67 MILLION/UL (ref 3.88–5.62)
SODIUM SERPL-SCNC: 140 MMOL/L (ref 136–145)
TROPONIN I SERPL-MCNC: 0.09 NG/ML
WBC # BLD AUTO: 10.68 THOUSAND/UL (ref 4.31–10.16)

## 2021-07-30 PROCEDURE — 92928 PRQ TCAT PLMT NTRAC ST 1 LES: CPT | Performed by: INTERNAL MEDICINE

## 2021-07-30 PROCEDURE — 99233 SBSQ HOSP IP/OBS HIGH 50: CPT | Performed by: INTERNAL MEDICINE

## 2021-07-30 PROCEDURE — 84100 ASSAY OF PHOSPHORUS: CPT | Performed by: PHYSICIAN ASSISTANT

## 2021-07-30 PROCEDURE — 85730 THROMBOPLASTIN TIME PARTIAL: CPT | Performed by: EMERGENCY MEDICINE

## 2021-07-30 PROCEDURE — 82330 ASSAY OF CALCIUM: CPT | Performed by: PHYSICIAN ASSISTANT

## 2021-07-30 PROCEDURE — 83605 ASSAY OF LACTIC ACID: CPT | Performed by: PHYSICIAN ASSISTANT

## 2021-07-30 PROCEDURE — 85730 THROMBOPLASTIN TIME PARTIAL: CPT | Performed by: PHYSICIAN ASSISTANT

## 2021-07-30 PROCEDURE — C1769 GUIDE WIRE: HCPCS

## 2021-07-30 PROCEDURE — NC001 PR NO CHARGE: Performed by: PHYSICIAN ASSISTANT

## 2021-07-30 PROCEDURE — C1874 STENT, COATED/COV W/DEL SYS: HCPCS

## 2021-07-30 PROCEDURE — 99291 CRITICAL CARE FIRST HOUR: CPT | Performed by: PHYSICIAN ASSISTANT

## 2021-07-30 PROCEDURE — 027035Z DILATION OF CORONARY ARTERY, ONE ARTERY WITH TWO DRUG-ELUTING INTRALUMINAL DEVICES, PERCUTANEOUS APPROACH: ICD-10-PCS | Performed by: INTERNAL MEDICINE

## 2021-07-30 PROCEDURE — C1725 CATH, TRANSLUMIN NON-LASER: HCPCS

## 2021-07-30 PROCEDURE — C1887 CATHETER, GUIDING: HCPCS

## 2021-07-30 PROCEDURE — 99152 MOD SED SAME PHYS/QHP 5/>YRS: CPT

## 2021-07-30 PROCEDURE — 99223 1ST HOSP IP/OBS HIGH 75: CPT | Performed by: INTERNAL MEDICINE

## 2021-07-30 PROCEDURE — C9600 PERC DRUG-EL COR STENT SING: HCPCS

## 2021-07-30 PROCEDURE — 4A023N7 MEASUREMENT OF CARDIAC SAMPLING AND PRESSURE, LEFT HEART, PERCUTANEOUS APPROACH: ICD-10-PCS | Performed by: INTERNAL MEDICINE

## 2021-07-30 PROCEDURE — 83735 ASSAY OF MAGNESIUM: CPT | Performed by: PHYSICIAN ASSISTANT

## 2021-07-30 PROCEDURE — 93458 L HRT ARTERY/VENTRICLE ANGIO: CPT

## 2021-07-30 PROCEDURE — 93458 L HRT ARTERY/VENTRICLE ANGIO: CPT | Performed by: INTERNAL MEDICINE

## 2021-07-30 PROCEDURE — C8929 TTE W OR WO FOL WCON,DOPPLER: HCPCS

## 2021-07-30 PROCEDURE — 84484 ASSAY OF TROPONIN QUANT: CPT | Performed by: NURSE PRACTITIONER

## 2021-07-30 PROCEDURE — 99152 MOD SED SAME PHYS/QHP 5/>YRS: CPT | Performed by: INTERNAL MEDICINE

## 2021-07-30 PROCEDURE — 85025 COMPLETE CBC W/AUTO DIFF WBC: CPT | Performed by: PHYSICIAN ASSISTANT

## 2021-07-30 PROCEDURE — B2111ZZ FLUOROSCOPY OF MULTIPLE CORONARY ARTERIES USING LOW OSMOLAR CONTRAST: ICD-10-PCS | Performed by: INTERNAL MEDICINE

## 2021-07-30 PROCEDURE — 93306 TTE W/DOPPLER COMPLETE: CPT | Performed by: INTERNAL MEDICINE

## 2021-07-30 PROCEDURE — 85347 COAGULATION TIME ACTIVATED: CPT

## 2021-07-30 PROCEDURE — 80048 BASIC METABOLIC PNL TOTAL CA: CPT | Performed by: PHYSICIAN ASSISTANT

## 2021-07-30 PROCEDURE — C1894 INTRO/SHEATH, NON-LASER: HCPCS

## 2021-07-30 RX ORDER — HEPARIN SODIUM 1000 [USP'U]/ML
INJECTION, SOLUTION INTRAVENOUS; SUBCUTANEOUS CODE/TRAUMA/SEDATION MEDICATION
Status: COMPLETED | OUTPATIENT
Start: 2021-07-30 | End: 2021-07-30

## 2021-07-30 RX ORDER — NITROGLYCERIN 20 MG/100ML
INJECTION INTRAVENOUS CODE/TRAUMA/SEDATION MEDICATION
Status: COMPLETED | OUTPATIENT
Start: 2021-07-30 | End: 2021-07-30

## 2021-07-30 RX ORDER — LORATADINE 10 MG/1
10 TABLET ORAL DAILY
Status: DISCONTINUED | OUTPATIENT
Start: 2021-07-31 | End: 2021-08-03 | Stop reason: HOSPADM

## 2021-07-30 RX ORDER — VANCOMYCIN HYDROCHLORIDE 1 G/200ML
1000 INJECTION, SOLUTION INTRAVENOUS ONCE
Status: DISCONTINUED | OUTPATIENT
Start: 2021-08-02 | End: 2021-08-03 | Stop reason: HOSPADM

## 2021-07-30 RX ORDER — ATORVASTATIN CALCIUM 40 MG/1
40 TABLET, FILM COATED ORAL
Status: DISCONTINUED | OUTPATIENT
Start: 2021-07-30 | End: 2021-08-03 | Stop reason: HOSPADM

## 2021-07-30 RX ORDER — ASPIRIN 81 MG/1
81 TABLET, CHEWABLE ORAL DAILY
Status: DISCONTINUED | OUTPATIENT
Start: 2021-07-31 | End: 2021-08-03 | Stop reason: HOSPADM

## 2021-07-30 RX ORDER — POTASSIUM CHLORIDE 20 MEQ/1
40 TABLET, EXTENDED RELEASE ORAL ONCE
Status: COMPLETED | OUTPATIENT
Start: 2021-07-30 | End: 2021-07-30

## 2021-07-30 RX ORDER — DULOXETIN HYDROCHLORIDE 60 MG/1
60 CAPSULE, DELAYED RELEASE ORAL DAILY
Status: DISCONTINUED | OUTPATIENT
Start: 2021-07-31 | End: 2021-08-03 | Stop reason: HOSPADM

## 2021-07-30 RX ORDER — LIDOCAINE HYDROCHLORIDE 10 MG/ML
INJECTION, SOLUTION EPIDURAL; INFILTRATION; INTRACAUDAL; PERINEURAL CODE/TRAUMA/SEDATION MEDICATION
Status: COMPLETED | OUTPATIENT
Start: 2021-07-30 | End: 2021-07-30

## 2021-07-30 RX ORDER — CLOPIDOGREL BISULFATE 75 MG/1
75 TABLET ORAL DAILY
Status: DISCONTINUED | OUTPATIENT
Start: 2021-07-31 | End: 2021-08-03 | Stop reason: HOSPADM

## 2021-07-30 RX ORDER — VERAPAMIL HYDROCHLORIDE 2.5 MG/ML
INJECTION, SOLUTION INTRAVENOUS CODE/TRAUMA/SEDATION MEDICATION
Status: COMPLETED | OUTPATIENT
Start: 2021-07-30 | End: 2021-07-30

## 2021-07-30 RX ORDER — FENTANYL CITRATE 50 UG/ML
INJECTION, SOLUTION INTRAMUSCULAR; INTRAVENOUS CODE/TRAUMA/SEDATION MEDICATION
Status: COMPLETED | OUTPATIENT
Start: 2021-07-30 | End: 2021-07-30

## 2021-07-30 RX ORDER — SODIUM CHLORIDE 9 MG/ML
100 INJECTION, SOLUTION INTRAVENOUS CONTINUOUS
Status: DISPENSED | OUTPATIENT
Start: 2021-07-30 | End: 2021-07-30

## 2021-07-30 RX ORDER — MIDAZOLAM HYDROCHLORIDE 2 MG/2ML
INJECTION, SOLUTION INTRAMUSCULAR; INTRAVENOUS CODE/TRAUMA/SEDATION MEDICATION
Status: COMPLETED | OUTPATIENT
Start: 2021-07-30 | End: 2021-07-30

## 2021-07-30 RX ADMIN — MIDAZOLAM 2 MG: 1 INJECTION INTRAMUSCULAR; INTRAVENOUS at 15:57

## 2021-07-30 RX ADMIN — Medication 12.5 MG: at 08:29

## 2021-07-30 RX ADMIN — LIDOCAINE HYDROCHLORIDE 1 ML: 10 INJECTION, SOLUTION EPIDURAL; INFILTRATION; INTRACAUDAL; PERINEURAL at 16:05

## 2021-07-30 RX ADMIN — NITROGLYCERIN 1 INCH: 20 OINTMENT TOPICAL at 16:50

## 2021-07-30 RX ADMIN — HEPARIN SODIUM 4000 UNITS: 1000 INJECTION INTRAVENOUS; SUBCUTANEOUS at 01:41

## 2021-07-30 RX ADMIN — TICAGRELOR 180 MG: 90 TABLET ORAL at 16:29

## 2021-07-30 RX ADMIN — IOHEXOL 100 ML: 350 INJECTION, SOLUTION INTRAVENOUS at 17:03

## 2021-07-30 RX ADMIN — SODIUM CHLORIDE 100 ML/HR: 0.9 INJECTION, SOLUTION INTRAVENOUS at 18:00

## 2021-07-30 RX ADMIN — METOPROLOL TARTRATE 25 MG: 25 TABLET, FILM COATED ORAL at 21:13

## 2021-07-30 RX ADMIN — IOHEXOL 20 ML: 350 INJECTION, SOLUTION INTRAVENOUS at 17:07

## 2021-07-30 RX ADMIN — PERFLUTREN 0.6 ML/MIN: 6.52 INJECTION, SUSPENSION INTRAVENOUS at 13:00

## 2021-07-30 RX ADMIN — HEPARIN SODIUM 17.1 UNITS/KG/HR: 10000 INJECTION, SOLUTION INTRAVENOUS at 12:51

## 2021-07-30 RX ADMIN — LIDOCAINE 1 PATCH: 50 PATCH TOPICAL at 08:35

## 2021-07-30 RX ADMIN — VERAPAMIL HYDROCHLORIDE 2.5 MG: 2.5 INJECTION, SOLUTION INTRAVENOUS at 16:09

## 2021-07-30 RX ADMIN — HEPARIN SODIUM 2000 UNITS: 1000 INJECTION INTRAVENOUS; SUBCUTANEOUS at 09:37

## 2021-07-30 RX ADMIN — HEPARIN SODIUM 5000 UNITS: 1000 INJECTION INTRAVENOUS; SUBCUTANEOUS at 16:28

## 2021-07-30 RX ADMIN — MELATONIN TAB 3 MG 6 MG: 3 TAB at 21:13

## 2021-07-30 RX ADMIN — FENTANYL CITRATE 50 MCG: 50 INJECTION INTRAMUSCULAR; INTRAVENOUS at 15:57

## 2021-07-30 RX ADMIN — MIDAZOLAM 1 MG: 1 INJECTION INTRAMUSCULAR; INTRAVENOUS at 16:06

## 2021-07-30 RX ADMIN — Medication 200 MCG: at 16:10

## 2021-07-30 RX ADMIN — FENTANYL CITRATE 50 MCG: 50 INJECTION INTRAMUSCULAR; INTRAVENOUS at 16:06

## 2021-07-30 RX ADMIN — IOHEXOL 80 ML: 350 INJECTION, SOLUTION INTRAVENOUS at 16:34

## 2021-07-30 RX ADMIN — HEPARIN SODIUM 3000 UNITS: 1000 INJECTION INTRAVENOUS; SUBCUTANEOUS at 16:38

## 2021-07-30 RX ADMIN — METFORMIN HYDROCHLORIDE 500 MG: 500 TABLET ORAL at 17:57

## 2021-07-30 RX ADMIN — POTASSIUM CHLORIDE 40 MEQ: 1500 TABLET, EXTENDED RELEASE ORAL at 08:27

## 2021-07-30 RX ADMIN — ASPIRIN 81 MG: 81 TABLET, COATED ORAL at 08:27

## 2021-07-30 RX ADMIN — HEPARIN SODIUM 4000 UNITS: 1000 INJECTION INTRAVENOUS; SUBCUTANEOUS at 16:09

## 2021-07-30 RX ADMIN — Medication 200 MCG: at 16:50

## 2021-07-30 NOTE — PROGRESS NOTES
Daily Progress Note - Critical Care   Marissa Mendez 77 y o  male MRN: [id number]  Unit/Bed#: [facility] 515-01 Encounter: [id number]        ----------------------------------------------------------------------------------------  HPI/24hr events:  20-year-old male status post cardiac arrest at a fitness center with return of circulation by bystanders and AED use   · Replete electrolytes  · No acute events overnight    ---------------------------------------------------------------------------------------  SUBJECTIVE  Patient has no complaints    Review of Systems   Constitutional: Negative for appetite change, diaphoresis, fatigue and fever  HENT: Negative for congestion, facial swelling, rhinorrhea, sneezing and tinnitus  Eyes: Negative for photophobia, pain and discharge  Respiratory: Negative for apnea, cough and shortness of breath  Cardiovascular: Negative for chest pain and leg swelling  Gastrointestinal: Negative for abdominal distention, abdominal pain, diarrhea, nausea and vomiting  Endocrine: Negative for cold intolerance, polydipsia and polyphagia  Genitourinary: Negative for enuresis, frequency and hematuria  Musculoskeletal: Negative for arthralgias, gait problem and myalgias  Skin: Negative for color change and rash  Allergic/Immunologic: Negative for environmental allergies and food allergies  Neurological: Positive for syncope and light-headedness  Negative for speech difficulty and headaches  Hematological: Negative for adenopathy  Does not bruise/bleed easily  Psychiatric/Behavioral: Negative for behavioral problems, decreased concentration and hallucinations  The patient is not hyperactive        Review of systems was reviewed and negative unless stated above in HPI/24-hour events   ---------------------------------------------------------------------------------------  Assessment and Plan:    Neuro:   · trend neuro checks     · Sleep/wake cycle regulation as able · Melatonin 6mg qHS  · CAM-ICU daily   · Analgesia- Tylenol p r n  For pain, lidocaine patch to fractured ribs  · Oxy IR 5 mg q 4 hours for severe pain            CV:  Cardiac arrest with ROSC, a fibrillation new onset, hypertension hyperlipidemia   Hold home medications for now   Started on metoprolol, aspirin 81 mg daily   Started on heparin drip   Echocardiogram is pending   Troponin q 3 hours times 3   Maintain MAP >65   Critical care monitoring   Optimize electrolytes magnesium calcium phosphorus and potassium   Cardiac arrest possibly cause arrhythmia vs ischemia    Pulm: acute resp insufficiency    Wean nasal cannula   Maintain SpO2 >92%    Continue pulmonary hygiene  Incentive spirometer q1h while awake, encourage coughing and deep breathing  Upright positioning      GI:  No issues     Stress ulcer prophylaxis: Pantoprazole PO   Bowel regimen:  None currently      :  No issues     Strict q2h I/O monitoring   Monitor Creatine / BUN    Creatinine 1 2          F/E/N:    Replete electrolytes with as needed to maintain K >4 0, Mag >2 0, Phos >3 0   Nutrition: npo   Maintenance fluids : isolyte 50 and hour           Heme/Onc:    Transfuse for hemoglobin <7 0    Transfuse for plts <15,000 or < 50,000 in the presence of bleeding    VTE prophylaxis:  Heparin drip, SCD's to BLE    Endo:  Type 2 diabetes   Insulin sliding scale   Adjust insulin regimen as needed to maintain goal -180  Lab Results   Component Value Date    HGBA1C 7 8 (H) 02/24/2021            ID:      Procalcitonin : No components found for: PROCAL   Monitor WBC / fever curve       MSK/Skin:    Reposition q2h, eliminate pressure points while in bed   Close skin surveillance     Disposition: Continue Critical Care   Code Status: Level 1 - Full Code  ---------------------------------------------------------------------------------------  ICU CORE MEASURES    Prophylaxis   VTE Pharmacologic Prophylaxis: Heparin Drip  VTE Mechanical Prophylaxis: sequential compression device  Stress Ulcer Prophylaxis: Pantoprazole PO    ABCDE Protocol (if indicated)  Plan to perform spontaneous awakening trial today? Not applicable  Plan to perform spontaneous breathing trial today? Not applicable  Obvious barriers to extubation? Not applicable  CAM-ICU: Negative    Invasive Devices Review  Invasive Devices     Peripheral Intravenous Line            Peripheral IV 21 Left Antecubital <1 day    Peripheral IV 21 Right Antecubital <1 day              Can any invasive devices be discontinued today? Not applicable  ---------------------------------------------------------------------------------------  OBJECTIVE    Vitals   Vitals:    21 2100 21 2200 21 2300 21 0000   BP: 114/74 106/62 108/66 112/65   BP Location:    Right arm   Pulse: 98 84 86 80   Resp: (!) 30 21 21 (!) 24   Temp:    98 °F (36 7 °C)   TempSrc:    Oral   SpO2: 97% 97% 96% 95%   Weight:         Temp (24hrs), Av 9 °F (36 6 °C), Min:97 4 °F (36 3 °C), Max:98 2 °F (36 8 °C)  Current: Temperature: 98 °F (36 7 °C)    Respiratory:  SpO2: SpO2: 95 %, SpO2 Activity: SpO2 Activity: At Rest, SpO2 Device: O2 Device: Nasal cannula  Nasal Cannula O2 Flow Rate (L/min): 2 L/min    Invasive/non-invasive ventilation settings   Respiratory    Lab Data (Last 4 hours)    None         O2/Vent Data (Last 4 hours)    None                Physical Exam  Vitals and nursing note reviewed  Constitutional:       Appearance: He is well-developed and normal weight  Interventions: Nasal cannula in place  HENT:      Head: Normocephalic and atraumatic  Eyes:      Pupils: Pupils are equal, round, and reactive to light  Cardiovascular:      Rate and Rhythm: Normal rate and regular rhythm  Pulses: Normal pulses  Heart sounds: Normal heart sounds  Pulmonary:      Effort: Pulmonary effort is normal       Breath sounds: Normal breath sounds     Abdominal: General: Bowel sounds are normal  There is no distension  Palpations: Abdomen is soft  Tenderness: There is no abdominal tenderness  There is no guarding  Genitourinary:     Penis: Normal     Musculoskeletal:         General: Normal range of motion  Cervical back: Normal range of motion and neck supple  Skin:     General: Skin is warm and dry  Capillary Refill: Capillary refill takes less than 2 seconds  Neurological:      Mental Status: He is alert and oriented to person, place, and time  Psychiatric:         Behavior: Behavior normal          Laboratory and Diagnostics:  Results from last 7 days   Lab Units 07/29/21  1513   WBC Thousand/uL 9 98   HEMOGLOBIN g/dL 13 1   HEMATOCRIT % 40 0   PLATELETS Thousands/uL 262   NEUTROS PCT % 51   MONOS PCT % 10     Results from last 7 days   Lab Units 07/29/21 1952 07/29/21  1513   SODIUM mmol/L 139 140   POTASSIUM mmol/L 4 0 3 3*   CHLORIDE mmol/L 105 104   CO2 mmol/L 25 25   ANION GAP mmol/L 9 11   BUN mg/dL 22 25   CREATININE mg/dL 1 27 1 53*   CALCIUM mg/dL 9 0 9 2   GLUCOSE RANDOM mg/dL 198* 246*   ALT U/L 77 86*   AST U/L 55* 75*   ALK PHOS U/L 70 72   ALBUMIN g/dL 3 6 3 7   TOTAL BILIRUBIN mg/dL 0 47 0 52     Results from last 7 days   Lab Units 07/29/21 1952 07/29/21  1829   MAGNESIUM mg/dL 2 1 2 1   PHOSPHORUS mg/dL 3 4  --       Results from last 7 days   Lab Units 07/30/21 0046 07/29/21 1952 07/29/21  1516   INR   --  1 04 0 96   PTT seconds 33 27 24      Results from last 7 days   Lab Units 07/30/21  0046 07/29/21 2123 07/29/21  1829 07/29/21  1513   TROPONIN I ng/mL 0 09* 0 09* 0 05* <0 02     Results from last 7 days   Lab Units 07/30/21  0046 07/29/21 1952 07/29/21  1724 07/29/21  1516   LACTIC ACID mmol/L 1 6 2 9* 3 4* 5 1*     ABG:    VBG:          Micro        EKG: normal EKG, normal sinus rhythm, unchanged from previous tracings  Imaging:  I have personally reviewed pertinent reports        Intake and Output  I/O 07/28 0701 - 07/29 0700 07/29 0701 - 07/30 0700    I V  (mL/kg)  29 7 (0 3)    Total Intake(mL/kg)  29 7 (0 3)    Urine (mL/kg/hr)  550    Stool  0    Total Output  550    Net  -520 3          Unmeasured Stool Occurrence  0 x            Height and Weights         Body mass index is 26 39 kg/m²  Weight (last 2 days)     Date/Time   Weight    07/29/21 1507   90 7 (200)                Nutrition       Diet Orders   (From admission, onward)             Start     Ordered    07/29/21 1840  Diet NPO  Diet effective now     Question Answer Comment   Diet Type NPO    RD to adjust diet per protocol?  No        07/29/21 1841                      Active Medications  Scheduled Meds:  Current Facility-Administered Medications   Medication Dose Route Frequency Provider Last Rate    aspirin  81 mg Oral Daily BRISA Delgadillo      heparin (porcine)  3-20 Units/kg/hr (Order-Specific) Intravenous Titrated BRISA Delgadillo 15 1 Units/kg/hr (07/30/21 0149)    heparin (porcine)  2,000 Units Intravenous Q1H PRN BRISA Delgadillo      heparin (porcine)  4,000 Units Intravenous Q1H PRN BRISA Delgadillo      lidocaine  1 patch Topical Daily Lois Lilly PA-C      melatonin  6 mg Oral HS Lois Lilly PA-C      metoprolol tartrate  12 5 mg Oral Q12H [facility] Ajay Vergara MD      pantoprazole  40 mg Oral Early Morning Lois Lilly PA-C       Continuous Infusions:  heparin (porcine), 3-20 Units/kg/hr (Order-Specific), Last Rate: 15 1 Units/kg/hr (07/30/21 0149)      PRN Meds:   heparin (porcine), 2,000 Units, Q1H PRN  heparin (porcine), 4,000 Units, Q1H PRN        Allergies   Allergies   Allergen Reactions    Codeine Rash     ---------------------------------------------------------------------------------------  Advance Directive and Living Will:      Power of :    POLST:    ---------------------------------------------------------------------------------------  Care Time Delivered:   Upon my evaluation, this patient had a high probability of imminent or life-threatening deterioration due to Status post cardiac arrest of unknown causes, which required my direct attention, intervention, and personal management  I have personally provided 45 minutes (0200 to [phone]) of critical care time, exclusive of procedures, teaching, family meetings, and any prior time recorded by providers other than myself  Lois Lilly PA-C      Portions of the record may have been created with voice recognition software  Occasional wrong word or "sound a like" substitutions may have occurred due to the inherent limitations of voice recognition software    Read the chart carefully and recognize, using context, where substitutions have occurred

## 2021-07-30 NOTE — CONSULTS
Consultation - Electrophysiology  Kenia Sarah 77 y o  male MRN: [id number]  Unit/Bed#: [facility] 515-01 Encounter: [id number]            Inpatient consult to Electrophysiology     Performed by  Kristofer Cedeno MD     Authorized by Juli Pizano MD            PCP: Kalpana Kolb MD   Outpatient Cardiologist: None    History of Present Illness   Physician Requesting Consult: Shanta Aldridge MD  Reason for Consult / Principal Problem: Cardiac Arrest/Atrial fibrillation    HPI: Kenia Sarah is a 77y o  year old male with a history of medication-controlled HTN, DM2, and dyslipidemia who presents following cardiac arrest after completing an approximately 90 minute workout at the gym  He reports that after finishing a session on the rowing machine, he felt dizzy while seated and almost immediately lost conciousness  Bystanders noted absence of a pulse and CPR was performed with AED showing a shockable rhythm  The patient achieved ROSC following defibrillation with no residual deficits  Subsequent ECG was significant for newly-identified atrial fibrillation, with initial investigations showing mild LFT elevations and lactic acidosis  PE was ruled out  Mr Christofer Anderson reports no previous episodes of chest pain, shortness of breath, lightheadedness, or fatigue either at rest or with his regular exercise  He reports a family history of heart disease with bypass, carotid stenosis, and atrial fibrillation in his mother, who  at 80  There is no family history of sudden death or syncope  Currently he reports no symptoms and states he has had no recent symptoms of infection (fever, rhinorrhea, cough, myalgia), changes in activity level, sleep disturbances, weight changes, or changes in bowel/bladder habits      He states that he has never had abnormal rhythms on prior ECGs and had an Exercise stress test done several years ago which was normal      Allergies: Codeine, Oxycodone  Past Medical History: HTN, HLD, DM2  Surgical History: Leg fracture repair   Family History: CAD, Afib, carotid stenosis in mother   Social History: rory camarena, smoked 1 pack daily for 8 years but quit 20 years ago, rare alcohol use    Medications: Atenolol, Chlorthalidone, Metformin, Saxaglitptin, Atorvastatin , Duloxetine    Review of Systems  ROS as noted above  Meds/Allergies   Hospital Medications:   Current Facility-Administered Medications   Medication Dose Route Frequency    aspirin (ECOTRIN LOW STRENGTH) EC tablet 81 mg  81 mg Oral Daily    heparin (porcine) 25,000 units in 0 45% NaCl 250 mL infusion (premix)  3-20 Units/kg/hr (Order-Specific) Intravenous Titrated    heparin (porcine) injection 2,000 Units  2,000 Units Intravenous Q1H PRN    heparin (porcine) injection 4,000 Units  4,000 Units Intravenous Q1H PRN    lidocaine (LIDODERM) 5 % patch 1 patch  1 patch Topical Daily    melatonin tablet 6 mg  6 mg Oral HS    metoprolol tartrate (LOPRESSOR) tablet 25 mg  25 mg Oral Q12H [address]    pantoprazole (PROTONIX) EC tablet 40 mg  40 mg Oral Early Morning     Home Medications:   Medications Prior to Admission   Medication    aspirin (ECOTRIN LOW STRENGTH) 81 mg EC tablet    atenolol-chlorthalidone (TENORETIC) 50-25 mg per tablet    atorvastatin (LIPITOR) 40 mg tablet    DULoxetine (CYMBALTA) 60 mg delayed release capsule    loratadine (CLARITIN) 10 mg tablet    metFORMIN (GLUCOPHAGE) 500 mg tablet    Methylcobalamin (B12-ACTIVE PO)    VITAMIN D PO    MECLIZINE HCL PO    MOMETASONE FUROATE NA    Onglyza 5 MG tablet       Allergies   Allergen Reactions    Codeine Rash       Objective   Vitals: Blood pressure (!) 151/112, pulse (!) 109, temperature 97 5 °F (36 4 °C), temperature source Oral, resp  rate 19, weight 87 9 kg (193 lb 12 6 oz), SpO2 93 %    Orthostatic Blood Pressures      Most Recent Value   Blood Pressure  (!) 151/112 filed at 07/30/2021 9753   Patient Position - Orthostatic VS  Lying filed at 07/30/2021 0800 Invasive Devices     Peripheral Intravenous Line            Peripheral IV 07/29/21 Right Antecubital <1 day    Peripheral IV 07/30/21 Distal;Left;Ventral (anterior) Forearm <1 day                Physical Exam  GEN: Ana Palm Hyduke appears well, alert and oriented x 3, pleasant and cooperative   HEENT:  Normocephalic, atraumatic, anicteric, moist mucous membranes  NECK: No JVD or carotid bruits   HEART: Irregular rhythm, rapid rate, no murmurs, clicks, gallops or rubs   LUNGS: Clear to auscultation bilaterally; no wheezes, rales, or rhonchi; respiration nonlabored   ABDOMEN:  Normoactive bowel sounds, soft, no tenderness, no distention  EXTREMITIES: peripheral pulses palpable; no edema  NEURO: no gross focal findings; cranial nerves grossly intact   SKIN:  Dry, intact, warm to touch    Lab Results:   Results from last 7 days   Lab Units 07/30/21  0046 07/29/21  2123 07/29/21  1829   TROPONIN I ng/mL 0 09* 0 09* 0 05*     Results from last 7 days   Lab Units 07/30/21  0524 07/29/21  1952 07/29/21  1513   POTASSIUM mmol/L 3 5 4 0 3 3*   CO2 mmol/L 28 25 25   CHLORIDE mmol/L 105 105 104   BUN mg/dL 23 22 25   CREATININE mg/dL 1 06 1 27 1 53*     Results from last 7 days   Lab Units 07/30/21  0524 07/29/21  1513   HEMOGLOBIN g/dL 13 2 13 1   HEMATOCRIT % 40 4 40 0   PLATELETS Thousands/uL 225 262     Results from last 7 days   Lab Units 07/30/21  0826 07/30/21  0524 07/30/21  0046   PTT seconds 53* 68* 33     Results from last 7 days   Lab Units 07/29/21  1513   HDL mg/dL 36*   TRIGLYCERIDES mg/dL 410*         Imaging:   CTA Chest: No evidence of PE,     Telemetry:   Consistently in atrial fibrillation over past 24hrs with HR ranging from 80-120bpm    EKG:   Date: 7/29  Interpretation: Atrial fibrillation at 90bpm, QTc 455      ECHO:  Results for orders placed during the hospital encounter of 07/29/21    Echo complete with contrast if indicated    Narrative  [id number] [facility]  [phone]    Transthoracic Echocardiogram  2D, M-mode, Doppler, and Color Doppler    Study date:  2021    Patient: Luis Rush  MR number: [id number]  Account number: [de-identified]  : 1955  Age: 77 years  Gender: Male  Status: Inpatient  Location: Emergency room  Height: 73 in  Weight: 200 lb  BP: 142/ 68 mmHg    Indications: Cardiac Arrest    Diagnoses: I46 9 - Cardiac arrest, cause unspecified    Sonographer:  Harish Cornelius [facility]  Primary Physician:  Minnie Guzman MD  Referring Physician:  Germain Prasad MD  Group:  [facility]  Interpreting Physician:  Celia Masters MD    SUMMARY    LEFT VENTRICLE:  Systolic function was normal  Ejection fraction was estimated to be 60 %  There were no regional wall motion abnormalities  ATRIAL SEPTUM:  There was a possible, medium-sized, spherical, echogenic, mobile mass on the right side of the interatrial septum  This was only visualized on one image  Repeat imaging with Definity recommended for further evaluation and distinction for  artifact  HISTORY: PRIOR HISTORY: No Cardiac History    PROCEDURE: The procedure was performed in the emergency room  This was a routine study  The transthoracic approach was used  The study included complete 2D imaging, M-mode, complete spectral Doppler, and color Doppler  The heart rate was  97 bpm, at the start of the study  Images were obtained from the parasternal, apical, subcostal, and suprasternal notch acoustic windows  Image quality was adequate  LEFT VENTRICLE: Size was normal  Systolic function was normal  Ejection fraction was estimated to be 60 %  There were no regional wall motion abnormalities   Wall thickness was normal  DOPPLER: The transmitral flow pattern was normal  Left  ventricular diastolic function parameters were normal     RIGHT VENTRICLE: The size was normal  Systolic function was normal  Wall thickness was normal     LEFT ATRIUM: Size was normal     ATRIAL SEPTUM: There was a possible, medium-sized, spherical, echogenic, mobile mass on the right side of the interatrial septum  This was only visualized on one image  Repeat imaging with Definity recommended for further evaluation and  distinction for artifact  RIGHT ATRIUM: Size was normal     MITRAL VALVE: Valve structure was normal  There was normal leaflet separation  DOPPLER: The transmitral velocity was within the normal range  There was no evidence for stenosis  There was trace regurgitation  AORTIC VALVE: The valve was trileaflet  Leaflets exhibited normal thickness and normal cuspal separation  DOPPLER: Transaortic velocity was within the normal range  There was no evidence for stenosis  There was no significant  regurgitation  TRICUSPID VALVE: The valve structure was normal  There was normal leaflet separation  DOPPLER: The transtricuspid velocity was within the normal range  There was no evidence for stenosis  There was trace regurgitation  Pulmonary artery  systolic pressure was within the normal range  Estimated peak PA pressure was 24 mmHg  PULMONIC VALVE: Leaflets exhibited normal thickness, no calcification, and normal cuspal separation  DOPPLER: The transpulmonic velocity was within the normal range  There was trace regurgitation  PERICARDIUM: There was no pericardial effusion  The pericardium was normal in appearance  AORTA: The root exhibited normal size  SYSTEMIC VEINS: IVC: The inferior vena cava was normal in size   Respirophasic changes were normal     SYSTEM MEASUREMENT TABLES    2D  %FS: 24 31 %  Ao Diam: 3 3 cm  Ao asc: 3 19 cm  EDV(Teich): 99 96 ml  EF(Teich): 48 36 %  ESV(Teich): 51 62 ml  IVSd: 1 35 cm  LA Diam: 3 54 cm  LAAs A4C: 15 57 cm2  LAESV A-L A4C: 43 45 ml  LAESV MOD A4C: 39 7 ml  LALs A4C: 4 74 cm  LVEDV MOD A4C: 55 01 ml  LVEF MOD A4C: 61 11 %  LVESV MOD A4C: 21 4 ml  LVIDd: 4 65 cm  LVIDs: 3 52 cm  LVLd A4C: 7 15 cm  LVLs A4C: 6 5 cm  LVPWd: 0 94 cm  RAEDV A-L: 22 22 ml  RAEDV MOD: 21 42 ml  RALd: 4 43 cm  RVIDd: 3 17 cm  SV MOD A4C: 33 62 ml  SV(Teich): 48 33 ml    CW  TR Vmax: 2 28 m/s  TR maxP 73 mmHg    MM  TAPSE: 2 22 cm    PW  E' Sept: 0 09 m/s  LVOT Env  Ti: 237 87 ms  LVOT VTI: 14 93 cm  LVOT Vmax: 0 85 m/s  LVOT Vmean: 0 63 m/s  LVOT maxP 95 mmHg  LVOT meanP 78 mmHg    [facility] Accredited [facility]chocardiography [facility]    Prepared and electronically signed by    Kin Hilton MD  Signed 2021 09:53:09    No results found for this or any previous visit  HOLTER  No results found for this or any previous visit  Assessment/Plan     Assessment:    1  Cardiac Arrest (potentially VT/VF)   2  Newly diagnosed atrial fibrillation   3  Troponin elevation     Etiologies for this patient's cardiac arrest would include coronary artery disease given the patient's risk factors, structural heart disease, channelopathies  No evidence of initial rhythm exists other than that a shockable rhythm was advised on AED, and ROSC soon after defibrillation  No clear substrate for AF or VT/VF was evident on TTE (HCM, atrial dilatation, low EF, significant valvular lesions, or myocardial scar)  A potential mass/thrombus was identified in the right atrium  However, atrial fibrillation is known to develop as a result of heavy exercise particularly in the elderly  Plan:  Would recommend EBONY to evaluate for intracardiac thrombus followed by cardioversion to sinus rhythm given that the atrial fibrillation is very likely of recent (although unknown) onset based on record review  Continue Metoprolol and therapeutic anticoagulation with IV heparin for now given multiple risk factors for stroke ([id number])  Consider evaluation for endocrine cause of atrial fibrillation with TSH  Patient is to undergo cardiac catheterization today afternoon   If unrevealing, will consider EP study to assess for inducible VT for ICD implantation  Case discussed and reviewed with Dr Ericka Rodriguez  Thank you for involving us in the care of your patient      Dieudonne Boggs MD  Cardiology Fellow PGY-4    ========================================================

## 2021-07-30 NOTE — PROGRESS NOTES
Cardiology Team 2 Progress Note - Key Lew 77 y o  male MRN: [id number]    Unit/Bed#: [facility] 275-16 Encounter: [id number]          Subjective:   Patient seen and examined  No significant events overnight  Denies any new active complaints this AM       Hospital Course: Key Lew is a 77y o  year old male with a history of HTN, HLD, DM2 (A1c 7 8%) who presents to the ED after a Cardiac Arrest  Patient states that he has been in his usual state of health, is extremely active, goes on long hikes, works out 3-4x a week (vigorously) and otherwise has no physical limitations  Patient states that he was at the gym today, had about a 1 5 hour work out where he was pushing himself, towards the end of his work out he was doing the row machine and after 20 minutes he suddenly felt lightheaded and dizzy, he subsequently passed out before bystanders arrived  He denies any CP or palpitations prior to the incident  Bystanders felt he had no pulse and began CPR, he underwent 4 rounds and an AED was placed which advised shock  It is unclear what rhythm was seen during this time  Patient was shocked x1 before he achieved ROSC       Patient arrived to the ED and EKG was notable for Atrial Fibrillation (patient denies a history of this prior to present episode)  Patient was hemodynamically stable with an initial BP of 129/66, HR in 90s, Lactic Acid 5 1 with a slight ROSIBEL of 1 5  Initial troponin negative  Potassium 3 3      Upon further history, patient denies any family history of SCD or channelopathys that he is aware of  No CAD  His mother and sister both have atrial fibrillation  Mother passed at age 80      Patient denies the use of tobacco (stopped smoking >40 years ago)  Denies alcohol or drug use  No herbal remedies or supplements      States he has not noticed any viral symptoms to his knowledge  Got the final dose of the COVID19 vaccine in February 2021      Vitals: Blood pressure 110/70, pulse 86, temperature 98 2 °F (36 8 °C), temperature source Oral, resp  rate 20, weight 87 9 kg (193 lb 12 6 oz), SpO2 96 %  , Body mass index is 25 57 kg/m² ,   Orthostatic Blood Pressures      Most Recent Value   Blood Pressure  110/70 filed at 07/30/2021 0700   Patient Position - Orthostatic VS  Lying filed at 07/30/2021 0500            Intake/Output Summary (Last 24 hours) at 7/30/2021 0756  Last data filed at 7/30/2021 0201  Gross per 24 hour   Intake 200 56 ml   Output 1050 ml   Net -849 44 ml         Physical Exam:    GEN: Gato Patino appears well, alert and oriented x 3, pleasant and cooperative   HEENT:  Normocephalic, atraumatic, anicteric, moist mucous membranes  NECK: No JVD or carotid bruits   HEART: reg rhythm, reg rate, normal S1 and S2, no murmurs, clicks, gallops or rubs   LUNGS: Clear to auscultation bilaterally; no wheezes, rales, or rhonchi; respiration nonlabored   ABDOMEN:  Normoactive bowel sounds, soft, no tenderness, no distention  EXTREMITIES: peripheral pulses palpable; no edema  NEURO: no gross focal findings; cranial nerves grossly intact   SKIN:  Dry, intact, warm to touch      Current Facility-Administered Medications:     aspirin (ECOTRIN LOW STRENGTH) EC tablet 81 mg, 81 mg, Oral, Daily, Freeda Minor, CRNP    heparin (porcine) 25,000 units in 0 45% NaCl 250 mL infusion (premix), 3-20 Units/kg/hr (Order-Specific), Intravenous, Titrated, Freeda Minor, CRNP, Last Rate: 13 6 mL/hr at 07/30/21 0149, 15 1 Units/kg/hr at 07/30/21 0149    heparin (porcine) injection 2,000 Units, 2,000 Units, Intravenous, Q1H PRN, Freeda Minor, CRNP    heparin (porcine) injection 4,000 Units, 4,000 Units, Intravenous, Q1H PRN, Freeda Minor, CRNP, 4,000 Units at 07/30/21 0141    lidocaine (LIDODERM) 5 % patch 1 patch, 1 patch, Topical, Daily, Lois Lilly PA-C, 1 patch at 07/29/21 1882    melatonin tablet 6 mg, 6 mg, Oral, HS, Lois I ANA Lilly    metoprolol tartrate (LOPRESSOR) partial tablet 12 5 mg, 12 5 mg, Oral, Q12H [address], Lilli Brown MD, 12 5 mg at 07/29/21 2056    pantoprazole (PROTONIX) EC tablet 40 mg, 40 mg, Oral, Early Morning, Lois Lilly PA-C    potassium chloride (K-DUR,KLOR-CON) CR tablet 40 mEq, 40 mEq, Oral, Once, Edi Forbes MD    Labs & Results:  Results from last 7 days   Lab Units 07/30/21  0046 07/29/21 2123 07/29/21  1829   TROPONIN I ng/mL 0 09* 0 09* 0 05*         Results from last 7 days   Lab Units 07/30/21  0524 07/29/21 1952 07/29/21  1513   POTASSIUM mmol/L 3 5 4 0 3 3*   CO2 mmol/L 28 25 25   CHLORIDE mmol/L 105 105 104   BUN mg/dL 23 22 25   CREATININE mg/dL 1 06 1 27 1 53*     Results from last 7 days   Lab Units 07/30/21  0524 07/29/21  1513   HEMOGLOBIN g/dL 13 2 13 1   HEMATOCRIT % 40 4 40 0   PLATELETS Thousands/uL 225 262     Results from last 7 days   Lab Units 07/29/21  1513   TRIGLYCERIDES mg/dL 410*   HDL mg/dL 36*   HEMOGLOBIN A1C % 7 6*       Telemetry:   Personally reviewed by Diony Morley MD:       VTE Prophylaxis: Heparin        Assessment:  Principal Problem:    Cardiac arrest ([facility] [address] )      1  Cardiac Arrest, Unclear Etiology   --> CTPE without evidence of PE but patient does have significant coronary calcification particularly in the Left system and based on this in the setting of vigorous exertion, a critical lesion could have caused VF arrest  Another possibility is an accessory pathway that degenerated to VF with onset of Afib, however at this time no available baseline EKG without AF to evaluate  2  Atrial Fibrillation, New Onset  --> [id number]: 3  3  HTN // HLD  4  DM2     Plan:  - f/u TTE  - c/w Aspirin 81mg PO Daily  - c/w Lipitor 40mg PO QHS  - c/w Metoprolol Tart  12 5mg PO BID  - Hold his Chlorthalidone given ROSIBEL  - Heparin ggt for AF with aPTT Q6H  --> Eventual transition to DOAC upon discharge  - Will consider cardioversion to obtain NSR given this is patients likely first episode   Unclear timeline of onset given patient denies feeling palpitations  Will likely require a EBONY prior to  Will continue to monitor clinical course  - f/u Cardiac Cath, tentatively today  - EP informed  --> Will also consider ICD for secondary prevention if needed  - Will consider MRI prior to d/c if other work up is unrevealing  - Continue with Telemetry    Case discussed and reviewed with Dr Fermin Barron who agrees with my assessment and plan  Thank you for involving us in the care of your patient  Tito So MD  Cardiology Fellow PGY-4      Plan Me [facility]/ [place]Care Everywhere records reviewed:     ** Please Note: Fluency DirectDictation voice to text software may have been used in the creation of this document   **

## 2021-07-30 NOTE — ASSESSMENT & PLAN NOTE
Lab Results   Component Value Date    HGBA1C 7 6 (H) 07/29/2021       No results for input(s): POCGLU in the last 72 hours      Blood Sugar Average: Last 72 hrs:  · Hold home oral medications  · Currently NPO but may require SSI  · q 6 BG checks while NPO  · Maintain -180

## 2021-07-30 NOTE — PROGRESS NOTES
[facility]  ICU Transfer Note - Nicanor Rojas 1955, 77 y o  male MRN: [id number]  Unit/Bed#: [facility] 922-25 Encounter: [id number]  Primary Care Provider: Kaley Hunter MD   Date and time admitted to hospital: 7/29/2021  3:04 PM    Atrial fibrillation ([facility] [address] )  Assessment & Plan  · New onset atrial fibrillation  · Unclear onset as pt arrived to the hospital in atrial fibrillation  · Currently rate controlled in the 90s on metoprolol 25 mg BID  · Continue heparin gtt for [facility]  · Will likely require transition to [address] upon discharge    DM2 (diabetes mellitus, type 2) ([facility] )  Assessment & Plan  Lab Results   Component Value Date    HGBA1C 7 6 (H) 07/29/2021       No results for input(s): POCGLU in the last 72 hours  Blood Sugar Average: Last 72 hrs:  · Hold home oral medications  · Currently NPO but may require SSI  · q 6 BG checks while NPO  · Maintain -180    Hyperlipidemia  Assessment & Plan  · Continue atorvastatin 40 mg q HS    Hypertension  Assessment & Plan  · Hold home medications  · Continue metoprolol 25 mg BID    * Cardiac arrest [facility])  Assessment & Plan  -13-year-old male status post cardiac arrest at a fitness center with return of circulation by bystanders and AED use  -Unclear etiology of cardiac arrest; ischemic versus arrhythmogenic    Pt will go for cardiac catheterization today  · Cardiology consulted  · Hold home medications for now  · Started on metoprolol, atorvastatin, aspirin 81 mg daily  · Started on heparin drip  · Echocardiogram with EF 60%, no regional wall motion abnormalities  · Trend troponin  · Maintain MAP >65  · Optimize electrolytes magnesium calcium phosphorus and potassium  · Continue telemetry        Code Status: Level 1 - Full Code  POA:    POLST:      Reason for ICU admission:   S/p cardiac arrest, hemodynamic monitoring    Active problems:   Principal Problem:    Cardiac arrest [facility])  Active Problems:    Hypertension Hyperlipidemia    DM2 (diabetes mellitus, type 2) ([facility])    Atrial fibrillation ([facility] [address] )  Resolved Problems:    * No resolved hospital problems  *      Consultants:   Cardiology    History of Present Illness/ Summary of clinical course: Alma Goddard is a 47-year-old male with a past medical history significant for hypertension, hyperlipidemia, diabetes who presented on 07/29 status post cardiac arrest   Patient was exercising on a rowing machine, became dizzy, and had a syncopal episode  Bystanders started CPR, he received 4 rounds of CPR followed by defibrillation by AED achieving ROSC  In the ER patient was alert, cool, clammy, and complaining of lightheadedness  Patient was hemodynamically stable with a heart rate of 94, but found to be in new onset atrial fibrillation  Cardiology was consulted and the patient was admitted to the ICU for close hemodynamic monitoring  Etiology of cardiac arrest is unclear; ischemic verusus arrhythmogenic  Pt will go for cardiac catheterization today at 3 pm      Recent or scheduled procedures:   Cardiac catheterization 7/30 at 3 pm    Outstanding/pending diagnostics:   XR chest 1 view portable    Result Date: 7/30/2021  Impression: No acute cardiopulmonary disease  Workstation performed: [id number]     CTA ED chest PE Study    Result Date: 7/29/2021  Impression: No pulmonary arterial embolism  No pulmonary infiltrate/consolidation or pulmonary contusion   Workstation performed: [id number]       Cultures:   N/A       Mobilization Plan:   OOB as tolerated    Nutrition Plan:   NPO    Invasive Devices Review  Invasive Devices     Peripheral Intravenous Line            Peripheral IV 07/29/21 Right Antecubital <1 day    Peripheral IV 07/30/21 Distal;Left;Ventral (anterior) Forearm <1 day                Rationale for remaining devices: N/A    VTE Pharmacologic Prophylaxis: Heparin Drip  VTE Mechanical Prophylaxis: sequential compression device    Initial Physical Therapy Recommendations: TBD  Initial Occupational Therapy Recommendations: TBD  Initial /Plan: TBD    Home medications that are not reordered and reason why:   Hold home DM medications while inpatient and use SSI to maintain BG goals  Spoke with Dr Tsering Jain, DO  regarding transfer  Please contact critical care via [employer] with any questions or concerns  Portions of the record may have been created with voice recognition software  Occasional wrong word or "sound a like" substitutions may have occurred due to the inherent limitations of voice recognition software  Read the chart carefully and recognize, using context, where substitutions have occurred

## 2021-07-30 NOTE — ASSESSMENT & PLAN NOTE
· New onset atrial fibrillation  · Unclear onset as pt arrived to the hospital in atrial fibrillation  · Currently rate controlled in the 90s on metoprolol 25 mg BID  · Continue heparin gtt for [facility]  · Will likely require transition to [address] upon discharge

## 2021-07-30 NOTE — ASSESSMENT & PLAN NOTE
-57-year-old male status post cardiac arrest at a fitness center with return of circulation by bystanders and AED use  -Unclear etiology of cardiac arrest; ischemic versus arrhythmogenic    Pt will go for cardiac catheterization today  · Cardiology consulted  · Hold home medications for now  · Started on metoprolol, atorvastatin, aspirin 81 mg daily  · Started on heparin drip  · Echocardiogram with EF 60%, no regional wall motion abnormalities  · Trend troponin  · Maintain MAP >65  · Optimize electrolytes magnesium calcium phosphorus and potassium  · Continue telemetry

## 2021-07-31 LAB
ANION GAP SERPL CALCULATED.3IONS-SCNC: 5 MMOL/L (ref 4–13)
ANION GAP SERPL CALCULATED.3IONS-SCNC: 7 MMOL/L (ref 4–13)
APTT PPP: 27 SECONDS (ref 23–37)
APTT PPP: 51 SECONDS (ref 23–37)
BASOPHILS # BLD AUTO: 0.04 THOUSANDS/ΜL (ref 0–0.1)
BASOPHILS NFR BLD AUTO: 0 % (ref 0–1)
BUN SERPL-MCNC: 19 MG/DL (ref 5–25)
BUN SERPL-MCNC: 19 MG/DL (ref 5–25)
CALCIUM SERPL-MCNC: 8.8 MG/DL (ref 8.3–10.1)
CALCIUM SERPL-MCNC: 9 MG/DL (ref 8.3–10.1)
CHLORIDE SERPL-SCNC: 103 MMOL/L (ref 100–108)
CHLORIDE SERPL-SCNC: 104 MMOL/L (ref 100–108)
CO2 SERPL-SCNC: 26 MMOL/L (ref 21–32)
CO2 SERPL-SCNC: 28 MMOL/L (ref 21–32)
CREAT SERPL-MCNC: 1.16 MG/DL (ref 0.6–1.3)
CREAT SERPL-MCNC: 1.22 MG/DL (ref 0.6–1.3)
EOSINOPHIL # BLD AUTO: 0.16 THOUSAND/ΜL (ref 0–0.61)
EOSINOPHIL NFR BLD AUTO: 2 % (ref 0–6)
ERYTHROCYTE [DISTWIDTH] IN BLOOD BY AUTOMATED COUNT: 13.9 % (ref 11.6–15.1)
ERYTHROCYTE [DISTWIDTH] IN BLOOD BY AUTOMATED COUNT: 13.9 % (ref 11.6–15.1)
GFR SERPL CREATININE-BSD FRML MDRD: 61 ML/MIN/1.73SQ M
GFR SERPL CREATININE-BSD FRML MDRD: 65 ML/MIN/1.73SQ M
GLUCOSE SERPL-MCNC: 148 MG/DL (ref 65–140)
GLUCOSE SERPL-MCNC: 155 MG/DL (ref 65–140)
GLUCOSE SERPL-MCNC: 198 MG/DL (ref 65–140)
GLUCOSE SERPL-MCNC: 230 MG/DL (ref 65–140)
GLUCOSE SERPL-MCNC: 232 MG/DL (ref 65–140)
HCT VFR BLD AUTO: 38.6 % (ref 36.5–49.3)
HCT VFR BLD AUTO: 41.3 % (ref 36.5–49.3)
HGB BLD-MCNC: 12.6 G/DL (ref 12–17)
HGB BLD-MCNC: 13 G/DL (ref 12–17)
IMM GRANULOCYTES # BLD AUTO: 0.03 THOUSAND/UL (ref 0–0.2)
IMM GRANULOCYTES NFR BLD AUTO: 0 % (ref 0–2)
INR PPP: 0.95 (ref 0.84–1.19)
INR PPP: 0.99 (ref 0.84–1.19)
KCT BLD-ACNC: 255 SEC (ref 89–137)
LYMPHOCYTES # BLD AUTO: 3.51 THOUSANDS/ΜL (ref 0.6–4.47)
LYMPHOCYTES NFR BLD AUTO: 35 % (ref 14–44)
MAGNESIUM SERPL-MCNC: 2.1 MG/DL (ref 1.6–2.6)
MCH RBC QN AUTO: 28.4 PG (ref 26.8–34.3)
MCH RBC QN AUTO: 28.5 PG (ref 26.8–34.3)
MCHC RBC AUTO-ENTMCNC: 31.5 G/DL (ref 31.4–37.4)
MCHC RBC AUTO-ENTMCNC: 32.6 G/DL (ref 31.4–37.4)
MCV RBC AUTO: 87 FL (ref 82–98)
MCV RBC AUTO: 90 FL (ref 82–98)
MONOCYTES # BLD AUTO: 1.11 THOUSAND/ΜL (ref 0.17–1.22)
MONOCYTES NFR BLD AUTO: 11 % (ref 4–12)
NEUTROPHILS # BLD AUTO: 5.3 THOUSANDS/ΜL (ref 1.85–7.62)
NEUTS SEG NFR BLD AUTO: 52 % (ref 43–75)
NRBC BLD AUTO-RTO: 0 /100 WBCS
PLATELET # BLD AUTO: 209 THOUSANDS/UL (ref 149–390)
PLATELET # BLD AUTO: 216 THOUSANDS/UL (ref 149–390)
PMV BLD AUTO: 9.7 FL (ref 8.9–12.7)
PMV BLD AUTO: 9.7 FL (ref 8.9–12.7)
POTASSIUM SERPL-SCNC: 3.5 MMOL/L (ref 3.5–5.3)
POTASSIUM SERPL-SCNC: 3.6 MMOL/L (ref 3.5–5.3)
PROTHROMBIN TIME: 12.7 SECONDS (ref 11.6–14.5)
PROTHROMBIN TIME: 13.1 SECONDS (ref 11.6–14.5)
RBC # BLD AUTO: 4.42 MILLION/UL (ref 3.88–5.62)
RBC # BLD AUTO: 4.57 MILLION/UL (ref 3.88–5.62)
SODIUM SERPL-SCNC: 136 MMOL/L (ref 136–145)
SODIUM SERPL-SCNC: 137 MMOL/L (ref 136–145)
SPECIMEN SOURCE: ABNORMAL
WBC # BLD AUTO: 10.15 THOUSAND/UL (ref 4.31–10.16)
WBC # BLD AUTO: 10.9 THOUSAND/UL (ref 4.31–10.16)

## 2021-07-31 PROCEDURE — 85025 COMPLETE CBC W/AUTO DIFF WBC: CPT | Performed by: INTERNAL MEDICINE

## 2021-07-31 PROCEDURE — 93306 TTE W/DOPPLER COMPLETE: CPT | Performed by: INTERNAL MEDICINE

## 2021-07-31 PROCEDURE — 85730 THROMBOPLASTIN TIME PARTIAL: CPT | Performed by: INTERNAL MEDICINE

## 2021-07-31 PROCEDURE — 99233 SBSQ HOSP IP/OBS HIGH 50: CPT | Performed by: INTERNAL MEDICINE

## 2021-07-31 PROCEDURE — 82948 REAGENT STRIP/BLOOD GLUCOSE: CPT

## 2021-07-31 PROCEDURE — 83735 ASSAY OF MAGNESIUM: CPT | Performed by: INTERNAL MEDICINE

## 2021-07-31 PROCEDURE — 85610 PROTHROMBIN TIME: CPT | Performed by: INTERNAL MEDICINE

## 2021-07-31 PROCEDURE — 80048 BASIC METABOLIC PNL TOTAL CA: CPT | Performed by: INTERNAL MEDICINE

## 2021-07-31 PROCEDURE — 85027 COMPLETE CBC AUTOMATED: CPT | Performed by: INTERNAL MEDICINE

## 2021-07-31 RX ORDER — HEPARIN SODIUM 1000 [USP'U]/ML
2000 INJECTION, SOLUTION INTRAVENOUS; SUBCUTANEOUS
Status: DISCONTINUED | OUTPATIENT
Start: 2021-07-31 | End: 2021-08-03 | Stop reason: HOSPADM

## 2021-07-31 RX ORDER — HEPARIN SODIUM 10000 [USP'U]/100ML
3-20 INJECTION, SOLUTION INTRAVENOUS
Status: DISCONTINUED | OUTPATIENT
Start: 2021-07-31 | End: 2021-08-03 | Stop reason: HOSPADM

## 2021-07-31 RX ORDER — HEPARIN SODIUM 1000 [USP'U]/ML
4000 INJECTION, SOLUTION INTRAVENOUS; SUBCUTANEOUS ONCE
Status: COMPLETED | OUTPATIENT
Start: 2021-07-31 | End: 2021-07-31

## 2021-07-31 RX ORDER — METOPROLOL TARTRATE 50 MG/1
50 TABLET, FILM COATED ORAL EVERY 12 HOURS SCHEDULED
Status: DISCONTINUED | OUTPATIENT
Start: 2021-07-31 | End: 2021-08-03 | Stop reason: HOSPADM

## 2021-07-31 RX ORDER — HEPARIN SODIUM 1000 [USP'U]/ML
4000 INJECTION, SOLUTION INTRAVENOUS; SUBCUTANEOUS
Status: DISCONTINUED | OUTPATIENT
Start: 2021-07-31 | End: 2021-08-03 | Stop reason: HOSPADM

## 2021-07-31 RX ORDER — HEPARIN SODIUM 5000 [USP'U]/ML
5000 INJECTION, SOLUTION INTRAVENOUS; SUBCUTANEOUS EVERY 8 HOURS SCHEDULED
Status: DISCONTINUED | OUTPATIENT
Start: 2021-07-31 | End: 2021-07-31

## 2021-07-31 RX ADMIN — HEPARIN SODIUM 11.1 UNITS/KG/HR: 10000 INJECTION, SOLUTION INTRAVENOUS at 11:36

## 2021-07-31 RX ADMIN — LIDOCAINE 1 PATCH: 50 PATCH TOPICAL at 09:55

## 2021-07-31 RX ADMIN — MELATONIN TAB 3 MG 6 MG: 3 TAB at 21:44

## 2021-07-31 RX ADMIN — INSULIN LISPRO 1 UNITS: 100 INJECTION, SOLUTION INTRAVENOUS; SUBCUTANEOUS at 17:35

## 2021-07-31 RX ADMIN — LORATADINE 10 MG: 10 TABLET ORAL at 09:54

## 2021-07-31 RX ADMIN — METOPROLOL TARTRATE 50 MG: 50 TABLET, FILM COATED ORAL at 21:44

## 2021-07-31 RX ADMIN — HEPARIN SODIUM 4000 UNITS: 1000 INJECTION INTRAVENOUS; SUBCUTANEOUS at 11:31

## 2021-07-31 RX ADMIN — ASPIRIN 81 MG CHEWABLE TABLET 81 MG: 81 TABLET CHEWABLE at 09:42

## 2021-07-31 RX ADMIN — METOPROLOL TARTRATE 25 MG: 25 TABLET, FILM COATED ORAL at 09:47

## 2021-07-31 RX ADMIN — INSULIN LISPRO 2 UNITS: 100 INJECTION, SOLUTION INTRAVENOUS; SUBCUTANEOUS at 21:49

## 2021-07-31 RX ADMIN — CLOPIDOGREL BISULFATE 75 MG: 75 TABLET ORAL at 09:43

## 2021-07-31 RX ADMIN — INSULIN LISPRO 2 UNITS: 100 INJECTION, SOLUTION INTRAVENOUS; SUBCUTANEOUS at 12:26

## 2021-07-31 RX ADMIN — PANTOPRAZOLE SODIUM 40 MG: 40 TABLET, DELAYED RELEASE ORAL at 05:58

## 2021-07-31 RX ADMIN — HEPARIN SODIUM 5000 UNITS: 5000 INJECTION INTRAVENOUS; SUBCUTANEOUS at 09:54

## 2021-07-31 RX ADMIN — DULOXETINE HYDROCHLORIDE 60 MG: 60 CAPSULE, DELAYED RELEASE ORAL at 09:56

## 2021-07-31 RX ADMIN — ATORVASTATIN CALCIUM 40 MG: 40 TABLET, FILM COATED ORAL at 17:23

## 2021-07-31 RX ADMIN — HEPARIN SODIUM 2000 UNITS: 1000 INJECTION INTRAVENOUS; SUBCUTANEOUS at 18:15

## 2021-07-31 NOTE — ASSESSMENT & PLAN NOTE
Lab Results   Component Value Date    HGBA1C 7 6 (H) 07/29/2021       No results for input(s): POCGLU in the last 72 hours      Blood Sugar Average: Last 72 hrs:     Hold oral meds  Continue insulin sliding scale

## 2021-07-31 NOTE — ASSESSMENT & PLAN NOTE
-47-year-old male status post cardiac arrest at a fitness center with return of circulation by bystanders and AED use  No PE on CT scan  Status post cardiac catheterization on 07/30 There is significant triple vessel coronary artery disease, status post DESx 2 and angioplasty  Currently on aspirin, Plavix, statin and beta-blocker  Cardiology and EP are following for possible dual-chamber defibrillator placement  Monitor on tele  Follow on cardiac echo

## 2021-07-31 NOTE — PROGRESS NOTES
Cardiology Progress Note - Viky Lopez 77 y o  male MRN: [id number]    Unit/Bed#: [facility] 523-01 Encounter: [id number]      Assessment:  Principal Problem:    Cardiac arrest [facility])  Active Problems:    Hypertension    Hyperlipidemia    DM2 (diabetes mellitus, type 2) ([facility] 75 )    Atrial fibrillation ([facility] )      Plan:  Patient comfortable this morning  He has no chest pain or significant dyspnea  Catheterization results appreciated  Patient had two stents placed in the obtuse marginal branch  99% lesion after PCI demonstrate 0% residual   Patient otherwise noted to have moderate proximal LAD, first diagonal, proximal right coronary and right PDA disease  Echocardiogram demonstrated preserved LV systolic function but concern raised in reference to echogenic mobile mass on the right side of the interatrial septum  Repeat echo with definity was performed but was unable to exclude this finding  Will arrange EBONY for Monday to better assess  Patient converted to sinus rhythm from atrial fibrillation  Given the paroxysmal atrial fibrillation however would like to restart intravenous heparin and whole MS sure that he is continuously in sinus rhythm  Will likely need oral anticoagulation at time of discharge planning but would hold on this for now given possibility of device placement on Monday  BMP today with potassium of 3 6 and creatinine of 1 22  Will titrate dose of metoprolol tartrate  Subjective:   Patient seen and examined  No significant events overnight   negative  Objective:     Vitals: Blood pressure 136/69, pulse 89, temperature 98 2 °F (36 8 °C), resp  rate 18, weight 89 kg (196 lb 3 4 oz), SpO2 96 %  , Body mass index is 25 89 kg/m² ,   Orthostatic Blood Pressures      Most Recent Value   Blood Pressure  136/69 filed at 07/31/2021 3539   Patient Position - Orthostatic VS  Lying filed at 07/31/2021 0248      ,      Intake/Output Summary (Last 24 hours) at 7/31/2021 1011  Last data filed at 7/30/2021 2257  Gross per 24 hour   Intake 1125 8 ml   Output 1050 ml   Net 75 8 ml       No significant arrhythmias seen on telemetry review         Physical Exam:    GEN: Eliane Patino appears well, alert and oriented x 3, pleasant and cooperative   NECK: supple, no carotid bruits, no JVD or HJR  HEART: normal rate, regular rhythm, normal S1 and S2, no murmurs, clicks, gallops or rubs   LUNGS: clear to auscultation bilaterally; no wheezes, rales, or rhonchi   ABDOMEN: normal bowel sounds, soft, no tenderness, no distention  EXTREMITIES: peripheral pulses normal; no clubbing, cyanosis, or edema  SKIN: warm and well perfused, no suspicious lesions on exposed skin    Labs & Results:    Admission on 07/29/2021   Component Date Value    WBC 07/29/2021 9 98     RBC 07/29/2021 4 61     Hemoglobin 07/29/2021 13 1     Hematocrit 07/29/2021 40 0     MCV 07/29/2021 87     MCH 07/29/2021 28 4     MCHC 07/29/2021 32 8     RDW 07/29/2021 13 3     MPV 07/29/2021 9 7     Platelets 82/04/3909 262     nRBC 07/29/2021 0     Neutrophils Relative 07/29/2021 51     Immat GRANS % 07/29/2021 1     Lymphocytes Relative 07/29/2021 35     Monocytes Relative 07/29/2021 10     Eosinophils Relative 07/29/2021 2     Basophils Relative 07/29/2021 1     Neutrophils Absolute 07/29/2021 5 24     Immature Grans Absolute 07/29/2021 0 05     Lymphocytes Absolute 07/29/2021 3 44     Monocytes Absolute 07/29/2021 0 97     Eosinophils Absolute 07/29/2021 0 23     Basophils Absolute 07/29/2021 0 05     Sodium 07/29/2021 140     Potassium 07/29/2021 3 3*    Chloride 07/29/2021 104     CO2 07/29/2021 25     ANION GAP 07/29/2021 11     BUN 07/29/2021 25     Creatinine 07/29/2021 1 53*    Glucose 07/29/2021 246*    Calcium 07/29/2021 9 2     AST 07/29/2021 75*    ALT 07/29/2021 86*    Alkaline Phosphatase 07/29/2021 72     Total Protein 07/29/2021 7 2     Albumin 07/29/2021 3 7     Total Bilirubin 07/29/2021 0 52     eGFR 07/29/2021 46     Troponin I 07/29/2021 <0 02     Protime 07/29/2021 12 8     INR 07/29/2021 0 96     PTT 07/29/2021 24     LACTIC ACID 07/29/2021 5 1*    Ventricular Rate 07/29/2021 92     Atrial Rate 07/29/2021 153     QRSD Interval 07/29/2021 92     QT Interval 07/29/2021 368     QTC Interval 07/29/2021 455     QRS Axis 07/29/2021 56     T Wave Axis 07/29/2021 34     LACTIC ACID 07/29/2021 3 4*    PTT 07/29/2021 27     Protime 07/29/2021 13 6     INR 07/29/2021 1 04     Troponin I 07/29/2021 0 05*    Hemoglobin A1C 07/29/2021 7 6*    EAG 07/29/2021 171     Cholesterol 07/29/2021 156     Triglycerides 07/29/2021 410*    HDL, Direct 07/29/2021 36*    LDL Calculated 07/29/2021      Magnesium 07/29/2021 2 1     Sodium 07/29/2021 139     Potassium 07/29/2021 4 0     Chloride 07/29/2021 105     CO2 07/29/2021 25     ANION GAP 07/29/2021 9     BUN 07/29/2021 22     Creatinine 07/29/2021 1 27     Glucose 07/29/2021 198*    Calcium 07/29/2021 9 0     AST 07/29/2021 55*    ALT 07/29/2021 77     Alkaline Phosphatase 07/29/2021 70     Total Protein 07/29/2021 7 0     Albumin 07/29/2021 3 6     Total Bilirubin 07/29/2021 0 47     eGFR 07/29/2021 58     LACTIC ACID 07/29/2021 2 9*    Magnesium 07/29/2021 2 1     Phosphorus 07/29/2021 3 4     Calcium, Ionized 07/29/2021 1 17     TSH 3RD GENERATON 07/29/2021 1 410     Troponin I 07/29/2021 0 09*    LDL Direct 07/29/2021 75     LACTIC ACID 07/30/2021 1 6     Troponin I 07/30/2021 0 09*    PTT 07/30/2021 33     PTT 07/30/2021 68*    Sodium 07/30/2021 140     Potassium 07/30/2021 3 5     Chloride 07/30/2021 105     CO2 07/30/2021 28     ANION GAP 07/30/2021 7     BUN 07/30/2021 23     Creatinine 07/30/2021 1 06     Glucose 07/30/2021 165*    Calcium 07/30/2021 8 9     eGFR 07/30/2021 72     LACTIC ACID 07/30/2021 1 4     WBC 07/30/2021 10 68*    RBC 07/30/2021 4 67     Hemoglobin 07/30/2021 13 2     Hematocrit 07/30/2021 40 4     MCV 07/30/2021 87     MCH 07/30/2021 28 3     MCHC 07/30/2021 32 7     RDW 07/30/2021 13 6     MPV 07/30/2021 9 5     Platelets 61/98/9434 225     nRBC 07/30/2021 0     Neutrophils Relative 07/30/2021 58     Immat GRANS % 07/30/2021 0     Lymphocytes Relative 07/30/2021 32     Monocytes Relative 07/30/2021 9     Eosinophils Relative 07/30/2021 1     Basophils Relative 07/30/2021 0     Neutrophils Absolute 07/30/2021 6 20     Immature Grans Absolute 07/30/2021 0 03     Lymphocytes Absolute 07/30/2021 3 36     Monocytes Absolute 07/30/2021 0 95     Eosinophils Absolute 07/30/2021 0 10     Basophils Absolute 07/30/2021 0 04     Magnesium 07/30/2021 2 2     Calcium, Ionized 07/30/2021 1 17     Phosphorus 07/30/2021 3 4     PTT 07/30/2021 53*    Sodium 07/31/2021 136     Potassium 07/31/2021 3 5     Chloride 07/31/2021 103     CO2 07/31/2021 26     ANION GAP 07/31/2021 7     BUN 07/31/2021 19     Creatinine 07/31/2021 1 16     Glucose 07/31/2021 148*    Calcium 07/31/2021 8 8     eGFR 07/31/2021 65     WBC 07/31/2021 10 15     RBC 07/31/2021 4 42     Hemoglobin 07/31/2021 12 6     Hematocrit 07/31/2021 38 6     MCV 07/31/2021 87     MCH 07/31/2021 28 5     MCHC 07/31/2021 32 6     RDW 07/31/2021 13 9     MPV 07/31/2021 9 7     Platelets 54/92/6418 216     nRBC 07/31/2021 0     Neutrophils Relative 07/31/2021 52     Immat GRANS % 07/31/2021 0     Lymphocytes Relative 07/31/2021 35     Monocytes Relative 07/31/2021 11     Eosinophils Relative 07/31/2021 2     Basophils Relative 07/31/2021 0     Neutrophils Absolute 07/31/2021 5 30     Immature Grans Absolute 07/31/2021 0 03     Lymphocytes Absolute 07/31/2021 3 51     Monocytes Absolute 07/31/2021 1 11     Eosinophils Absolute 07/31/2021 0 16     Basophils Absolute 07/31/2021 0 04     Magnesium 07/31/2021 2 1     Sodium 07/31/2021 137     Potassium 07/31/2021 3 6     Chloride 07/31/2021 104  CO2 2021 28     ANION GAP 2021 5     BUN 2021 19     Creatinine 2021 1 22     Glucose 2021 155*    Calcium 2021 9 0     eGFR 2021 61     WBC 2021 10 90*    RBC 2021 4 57     Hemoglobin 2021 13 0     Hematocrit 2021 41 3     MCV 2021 90     MCH 2021 28 4     MCHC 2021 31 5     RDW 2021 13 9     Platelets  209     MPV 2021 9 7     Protime 2021 13 1     INR 2021 0 99        Echo complete with contrast if indicated    Result Date: 2021  Narrative: [address] [address] [place] [address] [phone] Transthoracic Echocardiogram 2D, M-mode, Doppler, and Color Doppler Study date:  2021 Patient: Mary Gresham MR number: [id number] Account number: [de-identified] : 1955 Age: 77 years Gender: Male Status: Inpatient Location: Emergency room Height: 73 in Weight: 200 lb BP: 142/ 68 mmHg Indications: Cardiac Arrest Diagnoses: I46 9 - Cardiac arrest, cause unspecified Sonographer:  Laz Castillo RDCS Primary Physician:  Rekha Dong MD Referring Physician:  Marin Hurd MD Group:  [facility] Interpreting Physician:  Demi Arredondo MD SUMMARY LEFT VENTRICLE: Systolic function was normal  Ejection fraction was estimated to be 60 %  There were no regional wall motion abnormalities  ATRIAL SEPTUM: There was a possible, medium-sized, spherical, echogenic, mobile mass on the right side of the interatrial septum  This was only visualized on one image  Repeat imaging with Definity recommended for further evaluation and distinction for artifact  HISTORY: PRIOR HISTORY: No Cardiac History PROCEDURE: The procedure was performed in the emergency room  This was a routine study  The transthoracic approach was used  The study included complete 2D imaging, M-mode, complete spectral Doppler, and color Doppler   The heart rate was 97 bpm, at the start of the study  Images were obtained from the parasternal, apical, subcostal, and suprasternal notch acoustic windows  Image quality was adequate  LEFT VENTRICLE: Size was normal  Systolic function was normal  Ejection fraction was estimated to be 60 %  There were no regional wall motion abnormalities  Wall thickness was normal  DOPPLER: The transmitral flow pattern was normal  Left ventricular diastolic function parameters were normal  RIGHT VENTRICLE: The size was normal  Systolic function was normal  Wall thickness was normal  LEFT ATRIUM: Size was normal  ATRIAL SEPTUM: There was a possible, medium-sized, spherical, echogenic, mobile mass on the right side of the interatrial septum  This was only visualized on one image  Repeat imaging with Definity recommended for further evaluation and distinction for artifact  RIGHT ATRIUM: Size was normal  MITRAL VALVE: Valve structure was normal  There was normal leaflet separation  DOPPLER: The transmitral velocity was within the normal range  There was no evidence for stenosis  There was trace regurgitation  AORTIC VALVE: The valve was trileaflet  Leaflets exhibited normal thickness and normal cuspal separation  DOPPLER: Transaortic velocity was within the normal range  There was no evidence for stenosis  There was no significant regurgitation  TRICUSPID VALVE: The valve structure was normal  There was normal leaflet separation  DOPPLER: The transtricuspid velocity was within the normal range  There was no evidence for stenosis  There was trace regurgitation  Pulmonary artery systolic pressure was within the normal range  Estimated peak PA pressure was 24 mmHg  PULMONIC VALVE: Leaflets exhibited normal thickness, no calcification, and normal cuspal separation  DOPPLER: The transpulmonic velocity was within the normal range  There was trace regurgitation  PERICARDIUM: There was no pericardial effusion   The pericardium was normal in appearance  AORTA: The root exhibited normal size  SYSTEMIC VEINS: IVC: The inferior vena cava was normal in size  Respirophasic changes were normal  SYSTEM MEASUREMENT TABLES 2D %FS: 24 31 % Ao Diam: 3 3 cm Ao asc: 3 19 cm EDV(Teich): 99 96 ml EF(Teich): 48 36 % ESV(Teich): 51 62 ml IVSd: 1 35 cm LA Diam: 3 54 cm LAAs A4C: 15 57 cm2 LAESV A-L A4C: 43 45 ml LAESV MOD A4C: 39 7 ml LALs A4C: 4 74 cm LVEDV MOD A4C: 55 01 ml LVEF MOD A4C: 61 11 % LVESV MOD A4C: 21 4 ml LVIDd: 4 65 cm LVIDs: 3 52 cm LVLd A4C: 7 15 cm LVLs A4C: 6 5 cm LVPWd: 0 94 cm RAEDV A-L: 22 22 ml RAEDV MOD: 21 42 ml RALd: 4 43 cm RVIDd: 3 17 cm SV MOD A4C: 33 62 ml SV(Teich): 48 33 ml CW TR Vmax: 2 28 m/s TR maxP 73 mmHg MM TAPSE: 2 22 cm PW E' Sept: 0 09 m/s LVOT Env  Ti: 237 87 ms LVOT VTI: 14 93 cm LVOT Vmax: 0 85 m/s LVOT Vmean: 0 63 m/s LVOT maxP 95 mmHg LVOT meanP 78 mmHg [employer] Accredited Echocardiography Laboratory Prepared and electronically signed by Shreyas Quarles MD Signed 2021 09:53:09     XR chest 1 view portable    Result Date: 2021  Narrative: CHEST INDICATION:   cp  COMPARISON:  None EXAM PERFORMED/VIEWS:  XR CHEST PORTABLE FINDINGS: Cardiomediastinal silhouette appears unremarkable  The lungs are clear  No pneumothorax or pleural effusion  Osseous structures appear within normal limits for patient age  Impression: No acute cardiopulmonary disease   Workstation performed: [id number]     Cardiac coronary angio/pci    Result Date: 2021  Narrative: [facility] [address] [phone] [facility] Invasive Cardiovascular Lab Complete Report Patient: Butch Crenshaw MR number: [id number] Account number: [de-identified] Study date: 2021 Gender: Male : 1955 Height: 72 8 in Weight: 193 4 lb BSA: 2 12 mï¾² Allergies: CODEINE Diagnostic Cardiologist:  Johana Argueta MD Interventional Cardiologist:  Johana Argueta MD Primary Physician:  Bryson Morales MD SUMMARY CORONARY CIRCULATION: Proximal LAD: There was a 50 % stenosis at the origin of D1  1st diagonal: There was a tubular 50 % stenosis  Mid circumflex: There was a 100 % stenosis  This lesion is a chronic total occlusion  1st obtuse marginal: There was a discrete 99 % stenosis  The lesion was complex and eccentric  There was RAMBO grade 2 flow through the vessel (partial perfusion)  This is a likely culprit for the patient's clinical presentation  An intervention was performed  Proximal RCA: There was a tubular 50 % stenosis  Right PDA: There was a 50 % stenosis  1ST LESION INTERVENTIONS: A successful balloon angioplasty with stent procedure was performed on the 99 % lesion in the 1st obtuse marginal  Following intervention there was an excellent angiographic appearance with a 0 % residual stenosis  A Resolute Prashanth Rx 3 5 x 12mm drug-eluting stent was placed across the lesion and deployed at a maximum inflation pressure of 12 clarice  A Resolute [place] Rx 3 5 x 08mm drug-eluting stent was placed across the lesion and deployed at a maximum inflation pressure of 12 clarice  INDICATIONS: --  Possible CAD: myocardial infarction without ST elevation (NSTEMI)  PROCEDURES PERFORMED --  Left heart catheterization without ventriculogram  --  Left coronary angiography  --  Right coronary angiography  --  Inpatient  --  Mod Sedation Same Physician Initial 15min  --  Coronary Catheterization (w/ LHC)  --  Mod Sedation Same Physician Add 15min  --  Mod Sedation Same Physician Add 15min  --  Coronary Drug Eluting Stent w/PTCA  --  Intervention on OM1: balloon angioplasty, stent  PROCEDURE: The risks and alternatives of the procedures and conscious sedation were explained to the patient and informed consent was obtained  The patient was brought to the cath lab and placed on the table  The planned puncture sites were prepped and draped in the usual sterile fashion  --  Right radial artery access   After performing an Valdez's test to verify adequate ulnar artery supply to the hand, the radial site was prepped  The puncture site was infiltrated with local anesthetic  The vessel was accessed using the modified Seldinger technique, a wire was advanced into the vessel, and a sheath was advanced over the wire into the vessel  --  Left heart catheterization without ventriculogram  A catheter was advanced over a guidewire into the ascending aorta  After recording ascending aortic pressure, the catheter was advanced across the aortic valve and left ventricular pressure was recorded  The catheter was pulled back across the aortic valve and into the ascending aorta and pullback pressures were obtained  --  Left coronary artery angiography  A catheter was advanced over a guidewire into the aorta and positioned in the left coronary artery ostium under fluoroscopic guidance  Angiography was performed  --  Right coronary artery angiography  A catheter was advanced over a guidewire into the aorta and positioned in the right coronary artery ostium under fluoroscopic guidance  Angiography was performed  --  Inpatient  --  Mod Sedation Same Physician Initial 15min  --  Coronary Catheterization (w/ LHC)  --  Mod Sedation Same Physician Add 15min  --  Mod Sedation Same Physician Add 15min  LESION INTERVENTION: A successful balloon angioplasty with stent procedure was performed on the 99 % lesion in the 1st obtuse marginal  Following intervention there was an excellent angiographic appearance with a 0 % residual stenosis  There was RAMBO 2 flow before the procedure and RAMBO 3 flow after the procedure  There was no dissection  --  Vessel setup was performed  A 6Fr  Launcher Ebu 3 75 guiding catheter was used to cannulate the vessel  --  Vessel setup was performed  A Runthrough NS 180cm wire was used to cross the lesion   --  Balloon angioplasty was performed, using a Euphora Rx 2 5 x 15mm balloon, with 1 inflations and a maximum inflation pressure of 8 clarice  --  A Resolute Prashanth Rx 3 5 x 12mm drug-eluting stent was placed across the lesion and deployed at a maximum inflation pressure of 12 clarice  --  A Resolute [place] Rx 3 5 x 08mm drug-eluting stent was placed across the lesion and deployed at a maximum inflation pressure of 12 clarice  INTERVENTIONS: --  Coronary Drug Eluting Stent w/PTCA  PROCEDURE COMPLETION: The patient tolerated the procedure well and was discharged from the cath lab  TIMING: Test started at 15:56  Test concluded at 17:07  HEMOSTASIS: The sheath was removed  The site was compressed with a Hemoband device  Hemostasis was obtained  MEDICATIONS GIVEN: Versed (2mg/2ml), 2 mg, IV, at 15:56  Fentanyl (1OOmcg/2 ml), 50 mcg, IV, at 15:56  Versed (2mg/2ml), 1 mg, IV, at 16:05  Fentanyl (1OOmcg/2 ml), 50 mcg, IV, at 16:05  1% Lidocaine, 1 ml, subcutaneously, at 16:05  Heparin 1000 units/ml, 4,000 units, IV, at 16:09  Verapamil (5mg/2ml), 2 5 mg, IV, at 16:09  Nitroglycerin (200mcg/ml), 200 mcg, at 16:09  Heparin 1000 units/ml, 5,000 units, IV, at 16:28  Ticagrelor, 180 mg, PO, at 16:29  Heparin 1000 units/ml, 3,000 units, IV, at 16:38  Nitro Paste, 1, topically, at 16:49, in  Nitroglycerine (200mcg/ml), 200 mcg, at 16:49  CONTRAST GIVEN: 80 ml Omnipaque (350mg I /ml)  100 ml Omnipaque (350mg I /ml)  20 ml Omnipaque (350mg I /ml)  RADIATION EXPOSURE: Fluoroscopy time: 20 53 min  HEMODYNAMICS: Hemodynamic assessment demonstrated normal LVEDP  CORONARY VESSELS:   --  The coronary circulation is right dominant  --  Left main: 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  --  LAD: The vessel was medium sized  Angiography showed moderate atherosclerosis  --  Proximal LAD: There was a 50 % stenosis at the origin of D1  --  1st diagonal: The vessel was small sized  There was a tubular 50 % stenosis  --  Circumflex: The vessel was large sized  --  Mid circumflex: There was a 100 % stenosis   This lesion is a chronic total occlusion  --  1st obtuse marginal: There was a discrete 99 % stenosis  The lesion was complex and eccentric  There was RAMBO grade 2 flow through the vessel (partial perfusion)  This is a likely culprit for the patient's clinical presentation  An intervention was performed  --  2nd obtuse marginal: The vessel was small sized  The artery was supplied by collaterals  The distal vessel was supplied by limited collaterals from the first obtuse marginal  --  RCA: The vessel was medium sized  Angiography showed mild atherosclerosis  --  Proximal RCA: There was a tubular 50 % stenosis  --  Distal RCA: There was a 30 % stenosis  --  Right PDA: There was a 50 % stenosis  IMPRESSIONS: There is significant triple vessel coronary artery disease  RECOMMENDATIONS Patient management should include increasing lipid lowering therapy  Patient management to include dual antiplatelet therapy  DISPOSITION: The patient left the catheterization laboratory in stable condition  Prepared and signed by Fredy South MD Signed 07/30/2021 17:19:12 Study diagram Angiographic findings Native coronary lesions: ï¾·Proximal LAD: Lesion 1: 50 % stenosis  ï¾·D1: Lesion 1: tubular, 50 % stenosis  ï¾·Mid circumflex: Lesion 1: 100 % stenosis  ï¾·OM1: Lesion 1: discrete, 99 % stenosis  ï¾·Proximal RCA: Lesion 1: tubular, 50 % stenosis  ï¾·Distal RCA: Lesion 1: 30 % stenosis  ï¾·RPDA: Lesion 1: 50 % stenosis  Intervention results Native coronary lesions: ï¾·Successful balloon angioplasty and stent of the 99 % stenosis in OM1  Appearance excellent with 0 % residual stenosis  Stent: Resolute Prashanth Rx 3 5 x 12mm drug-eluting  Stent: Resolute Prashanth Rx 3 5 x 08mm drug-eluting   Hemodynamic tables Pressures:  Baseline Pressures:  - HR: 87 Pressures:  - Rhythm: Pressures:  -- Aortic Pressure (S/D/M): 121/67/96 Pressures:  -- Left Ventricle (s/edp): 122/15/-- Outputs:  Baseline Outputs:  -- CALCULATIONS[age] Outputs:  -- CALCULATIONS: Body Surface Area: 2 12 Outputs:  -- CALCULATIONS: Height in cm: 185 00 Outputs:  -- CALCULATIONS: Sex: Male Outputs:  -- CALCULATIONS: Weight in k 90     CTA ED chest PE Study    Result Date: 2021  Narrative: CTA - CHEST WITH IV CONTRAST - PULMONARY ANGIOGRAM INDICATION:   cardiac arrest, new afib  "[facility]  4 rounds of cpr and one shock from AED  Pt is a/o x4  Pt is c/o chest pain and feels diaphoretic" COMPARISON: None  TECHNIQUE: CTA examination of the chest was performed using angiographic technique according to a protocol specifically tailored to evaluate for pulmonary embolism  Axial, sagittal, and coronal 2D reformatted images were created from the source data and  submitted for interpretation  In addition, coronal 3D MIP postprocessing was performed on the acquisition scanner  Radiation dose length product (DLP) for this visit:  622 mGy-cm   This examination, like all CT scans performed in the [facility], was performed utilizing techniques to minimize radiation dose exposure, including the use of iterative reconstruction and automated exposure control  IV Contrast:  85 mL of iohexol (OMNIPAQUE)  FINDINGS: PULMONARY ARTERIAL TREE:  No pulmonary embolus is seen  LUNGS:  Hypoventilatory changes in the dependent portions of the lung bases without other acute findings  No endotracheal or endobronchial lesion  PLEURA:  Unremarkable  HEART/GREAT VESSELS:  Coronary artery calcifications  Cardiomegaly  No thoracic aortic aneurysm  MEDIASTINUM AND WILIAM:  Unremarkable  CHEST WALL AND LOWER NECK:   Unremarkable  VISUALIZED STRUCTURES IN THE UPPER ABDOMEN:  Heterogeneous and fatty infiltration of the partially imaged liver  OSSEOUS STRUCTURES:  No acute fracture or destructive osseous lesion  Impression: No pulmonary arterial embolism  No pulmonary infiltrate/consolidation or pulmonary contusion   Workstation performed: [id number]       EKG personally reviewed by Toya Patterson MD      Counseling / Coordination of Care  Total floor / unit time spent today 30 minutes  Greater than 50% of total time was spent with the patient and / or family counseling and / or coordination of care

## 2021-07-31 NOTE — PROGRESS NOTES
[address] [facility]  Progress Note - Jordan Virgen 1955, 77 y o  male MRN: [id number]  Unit/Bed#: [facility] 523-01 Encounter: [id number]  Primary Care Provider: Reinier Quintana MD   Date and time admitted to hospital: 7/29/2021  3:04 PM    * Cardiac arrest [facility])  Assessment & Plan  -28-year-old male status post cardiac arrest at a fitness center with return of circulation by bystanders and AED use  No PE on CT scan  Status post cardiac catheterization on 07/30 There is significant triple vessel coronary artery disease, status post DESx 2 and angioplasty  Currently on aspirin, Plavix, statin and beta-blocker  Cardiology and EP are following for possible dual-chamber defibrillator placement  Monitor on tele  Follow on cardiac echo    Atrial fibrillation ([facility] )  Assessment & Plan  · New onset atrial fibrillation  · Unclear onset as pt arrived to the hospital in atrial fibrillation  ? Cardiology is following    DM2 (diabetes mellitus, type 2) ([facility] )  Assessment & Plan  Lab Results   Component Value Date    HGBA1C 7 6 (H) 07/29/2021       No results for input(s): POCGLU in the last 72 hours  Blood Sugar Average: Last 72 hrs:     Hold oral meds  Continue insulin sliding scale    Hypertension  Assessment & Plan  Acceptable BP  Continue Lopressor      VTE Pharmacologic Prophylaxis:       Patient Centered Rounds: I performed bedside rounds with nursing staff today  Discussions with Specialists or Other Care Team Provider:     Education and Discussions with Family / Patient:  Patient    Time Spent for Care: 45 minutes  More than 50% of total time spent on counseling and coordination of care as described above      Current Length of Stay: 2 day(s)  Current Patient Status: Inpatient   Certification Statement: The patient will continue to require additional inpatient hospital stay due to Above  Discharge Plan: TBD    Code Status: Level 1 - Full Code    Subjective:   Patient seen examined  Comfortable in bed  No chest pain or shortness of breath  ICU chart reviewed    Objective:     Vitals:   Temp (24hrs), Av 4 °F (36 9 °C), Min:98 °F (36 7 °C), Max:98 7 °F (37 1 °C)    Temp:  [98 °F (36 7 °C)-98 7 °F (37 1 °C)] 98 2 °F (36 8 °C)  HR:  [78-92] 82  Resp:  [18-28] 18  BP: (104-139)/(54-73) 139/72  SpO2:  [92 %-97 %] 96 %  Body mass index is 25 89 kg/m²  Input and Output Summary (last 24 hours):      Intake/Output Summary (Last 24 hours) at 2021 7666  Last data filed at 2021 2257  Gross per 24 hour   Intake 1153 72 ml   Output 1050 ml   Net 103 72 ml       Physical Exam:   Physical Exam   Patient is awake alert oriented no acute distress  Lung clear to auscultation bilateral  Heart positive S1-S2 no murmur  Abdomen soft nontender  Lower extremities no edema    Additional Data:     Labs:  Results from last 7 days   Lab Units 21  0455 21  0313   WBC Thousand/uL 10 90* 10 15   HEMOGLOBIN g/dL 13 0 12 6   HEMATOCRIT % 41 3 38 6   PLATELETS Thousands/uL 209 216   NEUTROS PCT %  --  52   LYMPHS PCT %  --  35   MONOS PCT %  --  11   EOS PCT %  --  2     Results from last 7 days   Lab Units 21  0455 21  1952   SODIUM mmol/L 137 139   POTASSIUM mmol/L 3 6 4 0   CHLORIDE mmol/L 104 105   CO2 mmol/L 28 25   BUN mg/dL 19 22   CREATININE mg/dL 1 22 1 27   ANION GAP mmol/L 5 9   CALCIUM mg/dL 9 0 9 0   ALBUMIN g/dL  --  3 6   TOTAL BILIRUBIN mg/dL  --  0 47   ALK PHOS U/L  --  70   ALT U/L  --  77   AST U/L  --  55*   GLUCOSE RANDOM mg/dL 155* 198*     Results from last 7 days   Lab Units 21  0455   INR  0 99         Results from last 7 days   Lab Units 21  1513   HEMOGLOBIN A1C % 7 6*     Results from last 7 days   Lab Units 21  0524 21  0046 212 21  1724   LACTIC ACID mmol/L 1 4 1 6 2 9* 3 4*       Lines/Drains:  Invasive Devices     Peripheral Intravenous Line            Peripheral IV 21 Right Antecubital 1 day Peripheral IV 07/30/21 Distal;Left;Ventral (anterior) Forearm 1 day                  Telemetry:  Telemetry Orders (From admission, onward)             48 Hour Telemetry Monitoring  Continuous x 48 hours     Question:  Reason for 48 Hour Telemetry  Answer:  Acute MI, chest pain - R/O MI, or unstable angina                 Telemetry Reviewed:  Yes  Indication for Continued Telemetry Use: Arrthymias requiring medical therapy           Imaging: No pertinent imaging reviewed  Recent Cultures (last 7 days):         Last 24 Hours Medication List:   Current Facility-Administered Medications   Medication Dose Route Frequency Provider Last Rate    aspirin  81 mg Oral Daily Fabiola Chandler MD      atorvastatin  40 mg Oral Daily With Feliz León MD      clopidogrel  75 mg Oral Daily Fabiola Chandler MD      DULoxetine  60 mg Oral Daily Fabiola Chandler MD      heparin (porcine)  5,000 Units Subcutaneous [facility] Radha Jones DO      insulin lispro  1-5 Units Subcutaneous TID [facility] [place] Radha Jones DO      insulin lispro  1-5 Units Subcutaneous HS Radha Jones DO      lidocaine  1 patch Topical Daily Fabiola Chandler MD      loratadine  10 mg Oral Daily Fabiola Chandler MD      melatonin  6 mg Oral HS Fabiola Chandler MD      metoprolol tartrate  25 mg Oral Q12H [address] Fabiola Chandler MD      pantoprazole  40 mg Oral Early Morning MD Meenakshi Valentine ON 8/2/2021] vancomycin  1,000 mg Intravenous Once Fabiola Chandler MD          Today, Patient Was Seen By: Radha Jones DO    **Please Note: This note may have been constructed using a voice recognition system  **

## 2021-07-31 NOTE — ASSESSMENT & PLAN NOTE
· New onset atrial fibrillation  · Unclear onset as pt arrived to the hospital in atrial fibrillation  ?  Cardiology is following

## 2021-07-31 NOTE — PLAN OF CARE
Problem: Potential for Falls  Goal: Patient will remain free of falls  Description: INTERVENTIONS:  - Educate patient/family on patient safety including physical limitations  - Instruct patient to call for assistance with activity   - Consult OT/PT to assist with strengthening/mobility   - Keep Call bell within reach  - Keep bed low and locked with side rails adjusted as appropriate  - Keep care items and personal belongings within reach  - Initiate and maintain comfort rounds  - Make Fall Risk Sign visible to staff  - Offer Toileting every  Hours, in advance of need  - Initiate/Maintain alarm  - Obtain necessary fall risk management equipment:   - Apply yellow socks and bracelet for high fall risk patients  - Consider moving patient to room near nurses station  Outcome: Progressing     Problem: NEUROSENSORY - ADULT  Goal: Achieves stable or improved neurological status  Description: INTERVENTIONS  - Monitor and report changes in neurological status  - Monitor vital signs such as temperature, blood pressure, glucose, and any other labs ordered   - Initiate measures to prevent increased intracranial pressure  - Monitor for seizure activity and implement precautions if appropriate      Outcome: Progressing  Goal: Remains free of injury related to seizures activity  Description: INTERVENTIONS  - Maintain airway, patient safety  and administer oxygen as ordered  - Monitor patient for seizure activity, document and report duration and description of seizure to physician/advanced practitioner  - If seizure occurs,  ensure patient safety during seizure  - Reorient patient post seizure  - Seizure pads on all 4 side rails  - Instruct patient/family to notify RN of any seizure activity including if an aura is experienced  - Instruct patient/family to call for assistance with activity based on nursing assessment  - Administer anti-seizure medications if ordered    Outcome: Progressing  Goal: Achieves maximal functionality and self care  Description: INTERVENTIONS  - Monitor swallowing and airway patency with patient fatigue and changes in neurological status  - Encourage and assist patient to increase activity and self care     - Encourage visually impaired, hearing impaired and aphasic patients to use assistive/communication devices  Outcome: Progressing     Problem: CARDIOVASCULAR - ADULT  Goal: Maintains optimal cardiac output and hemodynamic stability  Description: INTERVENTIONS:  - Monitor I/O, vital signs and rhythm  - Monitor for S/S and trends of decreased cardiac output  - Administer and titrate ordered vasoactive medications to optimize hemodynamic stability  - Assess quality of pulses, skin color and temperature  - Assess for signs of decreased coronary artery perfusion  - Instruct patient to report change in severity of symptoms  Outcome: Progressing  Goal: Absence of cardiac dysrhythmias or at baseline rhythm  Description: INTERVENTIONS:  - Continuous cardiac monitoring, vital signs, obtain 12 lead EKG if ordered  - Administer antiarrhythmic and heart rate control medications as ordered  - Monitor electrolytes and administer replacement therapy as ordered  Outcome: Progressing     Problem: RESPIRATORY - ADULT  Goal: Achieves optimal ventilation and oxygenation  Description: INTERVENTIONS:  - Assess for changes in respiratory status  - Assess for changes in mentation and behavior  - Position to facilitate oxygenation and minimize respiratory effort  - Oxygen administered by appropriate delivery if ordered  - Initiate smoking cessation education as indicated  - Encourage broncho-pulmonary hygiene including cough, deep breathe, Incentive Spirometry  - Assess the need for suctioning and aspirate as needed  - Assess and instruct to report SOB or any respiratory difficulty  - Respiratory Therapy support as indicated  Outcome: Progressing     Problem: GASTROINTESTINAL - ADULT  Goal: Minimal or absence of nausea and/or vomiting  Description: INTERVENTIONS:  - Administer IV fluids if ordered to ensure adequate hydration  - Maintain NPO status until nausea and vomiting are resolved  - Nasogastric tube if ordered  - Administer ordered antiemetic medications as needed  - Provide nonpharmacologic comfort measures as appropriate  - Advance diet as tolerated, if ordered  - Consider nutrition services referral to assist patient with adequate nutrition and appropriate food choices  Outcome: Progressing  Goal: Maintains or returns to baseline bowel function  Description: INTERVENTIONS:  - Assess bowel function  - Encourage oral fluids to ensure adequate hydration  - Administer IV fluids if ordered to ensure adequate hydration  - Administer ordered medications as needed  - Encourage mobilization and activity  - Consider nutritional services referral to assist patient with adequate nutrition and appropriate food choices  Outcome: Progressing  Goal: Maintains adequate nutritional intake  Description: INTERVENTIONS:  - Monitor percentage of each meal consumed  - Identify factors contributing to decreased intake, treat as appropriate  - Assist with meals as needed  - Monitor I&O, weight, and lab values if indicated  - Obtain nutrition services referral as needed  Outcome: Progressing  Goal: Establish and maintain optimal ostomy function  Description: INTERVENTIONS:  - Assess bowel function  - Encourage oral fluids to ensure adequate hydration  - Administer IV fluids if ordered to ensure adequate hydration   - Administer ordered medications as needed  - Encourage mobilization and activity  - Nutrition services referral to assist patient with appropriate food choices  - Assess stoma site  - Consider wound care consult   Outcome: Progressing  Goal: Oral mucous membranes remain intact  Description: INTERVENTIONS  - Assess oral mucosa and hygiene practices  - Implement preventative oral hygiene regimen  - Implement oral medicated treatments as ordered  - Initiate Nutrition services referral as needed  Outcome: Progressing     Problem: GENITOURINARY - ADULT  Goal: Maintains or returns to baseline urinary function  Description: INTERVENTIONS:  - Assess urinary function  - Encourage oral fluids to ensure adequate hydration if ordered  - Administer IV fluids as ordered to ensure adequate hydration  - Administer ordered medications as needed  - Offer frequent toileting  - Follow urinary retention protocol if ordered  Outcome: Progressing  Goal: Absence of urinary retention  Description: INTERVENTIONS:  - Assess patients ability to void and empty bladder  - Monitor I/O  - Bladder scan as needed  - Discuss with physician/AP medications to alleviate retention as needed  - Discuss catheterization for long term situations as appropriate  Outcome: Progressing  Goal: Urinary catheter remains patent  Description: INTERVENTIONS:  - Assess patency of urinary catheter  - If patient has a chronic lu, consider changing catheter if non-functioning  - Follow guidelines for intermittent irrigation of non-functioning urinary catheter  Outcome: Progressing     Problem: METABOLIC, FLUID AND ELECTROLYTES - ADULT  Goal: Electrolytes maintained within normal limits  Description: INTERVENTIONS:  - Monitor labs and assess patient for signs and symptoms of electrolyte imbalances  - Administer electrolyte replacement as ordered  - Monitor response to electrolyte replacements, including repeat lab results as appropriate  - Instruct patient on fluid and nutrition as appropriate  Outcome: Progressing  Goal: Fluid balance maintained  Description: INTERVENTIONS:  - Monitor labs   - Monitor I/O and WT  - Instruct patient on fluid and nutrition as appropriate  - Assess for signs & symptoms of volume excess or deficit  Outcome: Progressing  Goal: Glucose maintained within target range  Description: INTERVENTIONS:  - Monitor Blood Glucose as ordered  - Assess for signs and symptoms of hyperglycemia and hypoglycemia  - Administer ordered medications to maintain glucose within target range  - Assess nutritional intake and initiate nutrition service referral as needed  Outcome: Progressing     Problem: SKIN/TISSUE INTEGRITY - ADULT  Goal: Skin Integrity remains intact(Skin Breakdown Prevention)  Description: Assess:  -Perform Tiago assessment every   -Clean and moisturize skin every   -Inspect skin when repositioning, toileting, and assisting with ADLS  -Assess under medical devices such as  every   -Assess extremities for adequate circulation and sensation     Bed Management:  -Have minimal linens on bed & keep smooth, unwrinkled  -Change linens as needed when moist or perspiring  -Avoid sitting or lying in one position for more than  hours while in bed  -Keep HOB at degrees     Toileting:  -Offer bedside commode  -Assess for incontinence every   -Use incontinent care products after each incontinent episode such as     Activity:  -Mobilize patient  times a day  -Encourage activity and walks on unit  -Encourage or provide ROM exercises   -Turn and reposition patient every  Hours  -Use appropriate equipment to lift or move patient in bed  -Instruct/ Assist with weight shifting every  when out of bed in chair  -Consider limitation of chair time  hour intervals    Skin Care:  -Avoid use of baby powder, tape, friction and shearing, hot water or constrictive clothing  -Relieve pressure over bony prominences using   -Do not massage red bony areas    Next Steps:  -Teach patient strategies to minimize risks such as    -Consider consults to  interdisciplinary teams such as   Outcome: Progressing  Goal: Incision(s), wounds(s) or drain site(s) healing without S/S of infection  Description: INTERVENTIONS  - Assess and document dressing, incision, wound bed, drain sites and surrounding tissue  - Provide patient and family education  - Perform skin care/dressing changes every Outcome: Progressing  Goal: Pressure injury heals and does not worsen  Description: Interventions:  - Implement low air loss mattress or specialty surface (Criteria met)  - Apply silicone foam dressing  - Instruct/assist with weight shifting every  minutes when in chair   - Limit chair time to  hour intervals  - Use special pressure reducing interventions such as  when in chair   - Apply fecal or urinary incontinence containment device   - Perform passive or active ROM every - Turn and reposition patient & offload bony prominences every  hours   - Utilize friction reducing device or surface for transfers   - Consider consults to  interdisciplinary teams such as   - Use incontinent care products after each incontinent episode such as - Consider nutrition services referral as needed  Outcome: Progressing     Problem: HEMATOLOGIC - ADULT  Goal: Maintains hematologic stability  Description: INTERVENTIONS  - Assess for signs and symptoms of bleeding or hemorrhage  - Monitor labs  - Administer supportive blood products/factors as ordered and appropriate  Outcome: Progressing     Problem: MUSCULOSKELETAL - ADULT  Goal: Maintain or return mobility to safest level of function  Description: INTERVENTIONS:  - Assess patient's ability to carry out ADLs; assess patient's baseline for ADL function and identify physical deficits which impact ability to perform ADLs (bathing, care of mouth/teeth, toileting, grooming, dressing, etc )  - Assess/evaluate cause of self-care deficits   - Assess range of motion  - Assess patient's mobility  - Assess patient's need for assistive devices and provide as appropriate  - Encourage maximum independence but intervene and supervise when necessary  - Involve family in performance of ADLs  - Assess for home care needs following discharge   - Consider OT consult to assist with ADL evaluation and planning for discharge  - Provide patient education as appropriate  Outcome: Progressing  Goal: Maintain proper alignment of affected body part  Description: INTERVENTIONS:  - Support, maintain and protect limb and body alignment  - Provide patient/ family with appropriate education  Outcome: Progressing     Problem: Prexisting or High Potential for Compromised Skin Integrity  Goal: Skin integrity is maintained or improved  Description: INTERVENTIONS:  - Identify patients at risk for skin breakdown  - Assess and monitor skin integrity  - Assess and monitor nutrition and hydration status  - Monitor labs   - Assess for incontinence   - Turn and reposition patient  - Assist with mobility/ambulation  - Relieve pressure over bony prominences  - Avoid friction and shearing  - Provide appropriate hygiene as needed including keeping skin clean and dry  - Evaluate need for skin moisturizer/barrier cream  - Collaborate with interdisciplinary team   - Patient/family teaching  - Consider wound care consult   Outcome: Progressing

## 2021-08-01 PROBLEM — I25.10 CAD (CORONARY ARTERY DISEASE): Status: ACTIVE | Noted: 2021-08-01

## 2021-08-01 PROBLEM — I51.89 ATRIAL MASS: Status: ACTIVE | Noted: 2021-08-01

## 2021-08-01 LAB
APTT PPP: 165 SECONDS (ref 23–37)
APTT PPP: 53 SECONDS (ref 23–37)
APTT PPP: 98 SECONDS (ref 23–37)
GLUCOSE SERPL-MCNC: 197 MG/DL (ref 65–140)
GLUCOSE SERPL-MCNC: 220 MG/DL (ref 65–140)
GLUCOSE SERPL-MCNC: 224 MG/DL (ref 65–140)
GLUCOSE SERPL-MCNC: 225 MG/DL (ref 65–140)

## 2021-08-01 PROCEDURE — 85730 THROMBOPLASTIN TIME PARTIAL: CPT | Performed by: INTERNAL MEDICINE

## 2021-08-01 PROCEDURE — 99233 SBSQ HOSP IP/OBS HIGH 50: CPT | Performed by: INTERNAL MEDICINE

## 2021-08-01 PROCEDURE — 99232 SBSQ HOSP IP/OBS MODERATE 35: CPT | Performed by: INTERNAL MEDICINE

## 2021-08-01 PROCEDURE — 82948 REAGENT STRIP/BLOOD GLUCOSE: CPT

## 2021-08-01 RX ORDER — LOSARTAN POTASSIUM 25 MG/1
25 TABLET ORAL DAILY
Status: DISCONTINUED | OUTPATIENT
Start: 2021-08-01 | End: 2021-08-03 | Stop reason: HOSPADM

## 2021-08-01 RX ORDER — INSULIN GLARGINE 100 [IU]/ML
10 INJECTION, SOLUTION SUBCUTANEOUS
Status: DISCONTINUED | OUTPATIENT
Start: 2021-08-01 | End: 2021-08-03 | Stop reason: HOSPADM

## 2021-08-01 RX ADMIN — INSULIN LISPRO 1 UNITS: 100 INJECTION, SOLUTION INTRAVENOUS; SUBCUTANEOUS at 22:29

## 2021-08-01 RX ADMIN — CLOPIDOGREL BISULFATE 75 MG: 75 TABLET ORAL at 08:23

## 2021-08-01 RX ADMIN — DULOXETINE HYDROCHLORIDE 60 MG: 60 CAPSULE, DELAYED RELEASE ORAL at 08:22

## 2021-08-01 RX ADMIN — LIDOCAINE 1 PATCH: 50 PATCH TOPICAL at 08:23

## 2021-08-01 RX ADMIN — LORATADINE 10 MG: 10 TABLET ORAL at 08:22

## 2021-08-01 RX ADMIN — ASPIRIN 81 MG CHEWABLE TABLET 81 MG: 81 TABLET CHEWABLE at 08:22

## 2021-08-01 RX ADMIN — METOPROLOL TARTRATE 50 MG: 50 TABLET, FILM COATED ORAL at 08:23

## 2021-08-01 RX ADMIN — INSULIN LISPRO 1 UNITS: 100 INJECTION, SOLUTION INTRAVENOUS; SUBCUTANEOUS at 08:22

## 2021-08-01 RX ADMIN — PANTOPRAZOLE SODIUM 40 MG: 40 TABLET, DELAYED RELEASE ORAL at 05:49

## 2021-08-01 RX ADMIN — HEPARIN SODIUM 2000 UNITS: 1000 INJECTION INTRAVENOUS; SUBCUTANEOUS at 17:30

## 2021-08-01 RX ADMIN — INSULIN LISPRO 2 UNITS: 100 INJECTION, SOLUTION INTRAVENOUS; SUBCUTANEOUS at 16:36

## 2021-08-01 RX ADMIN — LOSARTAN POTASSIUM 25 MG: 25 TABLET, FILM COATED ORAL at 11:53

## 2021-08-01 RX ADMIN — INSULIN GLARGINE 10 UNITS: 100 INJECTION, SOLUTION SUBCUTANEOUS at 22:27

## 2021-08-01 RX ADMIN — ATORVASTATIN CALCIUM 40 MG: 40 TABLET, FILM COATED ORAL at 16:38

## 2021-08-01 RX ADMIN — HEPARIN SODIUM 8.1 UNITS/KG/HR: 10000 INJECTION, SOLUTION INTRAVENOUS at 10:03

## 2021-08-01 RX ADMIN — INSULIN LISPRO 2 UNITS: 100 INJECTION, SOLUTION INTRAVENOUS; SUBCUTANEOUS at 11:54

## 2021-08-01 RX ADMIN — MELATONIN TAB 3 MG 6 MG: 3 TAB at 22:26

## 2021-08-01 RX ADMIN — METOPROLOL TARTRATE 50 MG: 50 TABLET, FILM COATED ORAL at 22:27

## 2021-08-01 NOTE — CASE MANAGEMENT
PT IS NOT A 30 DAYS READMISSION  PT IS NOT A BUNDLE  CM met with pt to discuss DCP and cm role  Pt lives with spouse in a 2sh with 2ste  Prior to admission pt was independent with ambulation and with ADLS  No prior hx of VNA  Pt's preference for pharmacy is [facility] on [address]  Pt denies any hx of drugs/ETOH or inpt psych stays  CM reviewed d/c planning process including the following: identifying help at home, patient preference for d/c planning needs, Discharge Lounge, [facility] Meds to Bed program, availability of treatment team to discuss questions or concerns patient and/or family may have regarding understanding medications and recognizing signs and symptoms once discharged  CM also encouraged patient to follow up with all recommended appointments after discharge  Patient advised of importance for patient and family to participate in managing patients medical well being

## 2021-08-01 NOTE — ASSESSMENT & PLAN NOTE
· New onset atrial fibrillation  · Unclear onset as pt arrived to the hospital in atrial fibrillation  ? Cardiology is following  ?  On heparin drip

## 2021-08-01 NOTE — PROGRESS NOTES
Cardiology Progress Note - Naomi Brennan 77 y o  male MRN: [id number]    Unit/Bed#: [facility] 523-01 Encounter: [id number]      Assessment:  Principal Problem:    Cardiac arrest [facility])  Active Problems:    Hypertension    Hyperlipidemia    DM2 (diabetes mellitus, type 2) ([address] )    Atrial fibrillation ([facility] )      Plan:  Patient comfortable today  He has no chest pain or significant dyspnea  Telemetry demonstrates sinus rhythm  Scheduled for device placement tomorrow  Will defer to EP service in reference to ongoing need for this given PCI  Patient is scheduled for EBONY tomorrow given abnormal finding on echocardiogram   Case discussed in detail with the patient and his family who were in the room this morning  On intravenous heparin given paroxysmal atrial fibrillation  Will hold on oral anticoagulant till status of device is clear  Will check BMP in the morning  Blood pressure still mildly elevated  Will add losartan  Subjective:   Patient seen and examined  No significant events overnight   negative  Objective:     Vitals: Blood pressure 141/73, pulse 78, temperature 98 6 °F (37 °C), temperature source Oral, resp  rate 18, weight 88 5 kg (195 lb 1 7 oz), SpO2 95 %  , Body mass index is 25 74 kg/m² ,   Orthostatic Blood Pressures      Most Recent Value   Blood Pressure  141/73 filed at 08/01/2021 0729   Patient Position - Orthostatic VS  Lying filed at 08/01/2021 0729      ,      Intake/Output Summary (Last 24 hours) at 8/1/2021 0949  Last data filed at 8/1/2021 0801  Gross per 24 hour   Intake 745 84 ml   Output 1100 ml   Net -354 16 ml       No significant arrhythmias seen on telemetry review         Physical Exam:    GEN: Brad Patino appears well, alert and oriented x 3, pleasant and cooperative   NECK: supple, no carotid bruits, no JVD or HJR  HEART: normal rate, regular rhythm, normal S1 and S2, no murmurs, clicks, gallops or rubs   LUNGS: clear to auscultation bilaterally; no wheezes, rales, or rhonchi   ABDOMEN: normal bowel sounds, soft, no tenderness, no distention  EXTREMITIES: peripheral pulses normal; no clubbing, cyanosis, or edema  SKIN: warm and well perfused, no suspicious lesions on exposed skin    Labs & Results:    Admission on 07/29/2021   Component Date Value    WBC 07/29/2021 9 98     RBC 07/29/2021 4 61     Hemoglobin 07/29/2021 13 1     Hematocrit 07/29/2021 40 0     MCV 07/29/2021 87     MCH 07/29/2021 28 4     MCHC 07/29/2021 32 8     RDW 07/29/2021 13 3     MPV 07/29/2021 9 7     Platelets 41/05/7533 262     nRBC 07/29/2021 0     Neutrophils Relative 07/29/2021 51     Immat GRANS % 07/29/2021 1     Lymphocytes Relative 07/29/2021 35     Monocytes Relative 07/29/2021 10     Eosinophils Relative 07/29/2021 2     Basophils Relative 07/29/2021 1     Neutrophils Absolute 07/29/2021 5 24     Immature Grans Absolute 07/29/2021 0 05     Lymphocytes Absolute 07/29/2021 3 44     Monocytes Absolute 07/29/2021 0 97     Eosinophils Absolute 07/29/2021 0 23     Basophils Absolute 07/29/2021 0 05     Sodium 07/29/2021 140     Potassium 07/29/2021 3 3*    Chloride 07/29/2021 104     CO2 07/29/2021 25     ANION GAP 07/29/2021 11     BUN 07/29/2021 25     Creatinine 07/29/2021 1 53*    Glucose 07/29/2021 246*    Calcium 07/29/2021 9 2     AST 07/29/2021 75*    ALT 07/29/2021 86*    Alkaline Phosphatase 07/29/2021 72     Total Protein 07/29/2021 7 2     Albumin 07/29/2021 3 7     Total Bilirubin 07/29/2021 0 52     eGFR 07/29/2021 46     Troponin I 07/29/2021 <0 02     Protime 07/29/2021 12 8     INR 07/29/2021 0 96     PTT 07/29/2021 24     LACTIC ACID 07/29/2021 5 1*    Ventricular Rate 07/29/2021 92     Atrial Rate 07/29/2021 153     QRSD Interval 07/29/2021 92     QT Interval 07/29/2021 368     QTC Interval 07/29/2021 455     QRS Axis 07/29/2021 56     T Wave Axis 07/29/2021 34     LACTIC ACID 07/29/2021 3 4*    PTT 07/29/2021 27     Protime 07/29/2021 13 6     INR 07/29/2021 1 04     Troponin I 07/29/2021 0 05*    Hemoglobin A1C 07/29/2021 7 6*    EAG 07/29/2021 171     Cholesterol 07/29/2021 156     Triglycerides 07/29/2021 410*    HDL, Direct 07/29/2021 36*    LDL Calculated 07/29/2021      Magnesium 07/29/2021 2 1     Sodium 07/29/2021 139     Potassium 07/29/2021 4 0     Chloride 07/29/2021 105     CO2 07/29/2021 25     ANION GAP 07/29/2021 9     BUN 07/29/2021 22     Creatinine 07/29/2021 1 27     Glucose 07/29/2021 198*    Calcium 07/29/2021 9 0     AST 07/29/2021 55*    ALT 07/29/2021 77     Alkaline Phosphatase 07/29/2021 70     Total Protein 07/29/2021 7 0     Albumin 07/29/2021 3 6     Total Bilirubin 07/29/2021 0 47     eGFR 07/29/2021 58     LACTIC ACID 07/29/2021 2 9*    Magnesium 07/29/2021 2 1     Phosphorus 07/29/2021 3 4     Calcium, Ionized 07/29/2021 1 17     TSH 3RD GENERATON 07/29/2021 1 410     Troponin I 07/29/2021 0 09*    LDL Direct 07/29/2021 75     LACTIC ACID 07/30/2021 1 6     Troponin I 07/30/2021 0 09*    PTT 07/30/2021 33     PTT 07/30/2021 68*    Sodium 07/30/2021 140     Potassium 07/30/2021 3 5     Chloride 07/30/2021 105     CO2 07/30/2021 28     ANION GAP 07/30/2021 7     BUN 07/30/2021 23     Creatinine 07/30/2021 1 06     Glucose 07/30/2021 165*    Calcium 07/30/2021 8 9     eGFR 07/30/2021 72     LACTIC ACID 07/30/2021 1 4     WBC 07/30/2021 10 68*    RBC 07/30/2021 4 67     Hemoglobin 07/30/2021 13 2     Hematocrit 07/30/2021 40 4     MCV 07/30/2021 87     MCH 07/30/2021 28 3     MCHC 07/30/2021 32 7     RDW 07/30/2021 13 6     MPV 07/30/2021 9 5     Platelets 23/98/8858 225     nRBC 07/30/2021 0     Neutrophils Relative 07/30/2021 58     Immat GRANS % 07/30/2021 0     Lymphocytes Relative 07/30/2021 32     Monocytes Relative 07/30/2021 9     Eosinophils Relative 07/30/2021 1     Basophils Relative 07/30/2021 0     Neutrophils Absolute 07/30/2021 6 20     Immature Grans Absolute 07/30/2021 0 03     Lymphocytes Absolute 07/30/2021 3 36     Monocytes Absolute 07/30/2021 0 95     Eosinophils Absolute 07/30/2021 0 10     Basophils Absolute 07/30/2021 0 04     Magnesium 07/30/2021 2 2     Calcium, Ionized 07/30/2021 1 17     Phosphorus 07/30/2021 3 4     PTT 07/30/2021 53*    Sodium 07/31/2021 136     Potassium 07/31/2021 3 5     Chloride 07/31/2021 103     CO2 07/31/2021 26     ANION GAP 07/31/2021 7     BUN 07/31/2021 19     Creatinine 07/31/2021 1 16     Glucose 07/31/2021 148*    Calcium 07/31/2021 8 8     eGFR 07/31/2021 65     WBC 07/31/2021 10 15     RBC 07/31/2021 4 42     Hemoglobin 07/31/2021 12 6     Hematocrit 07/31/2021 38 6     MCV 07/31/2021 87     MCH 07/31/2021 28 5     MCHC 07/31/2021 32 6     RDW 07/31/2021 13 9     MPV 07/31/2021 9 7     Platelets 20/17/5489 216     nRBC 07/31/2021 0     Neutrophils Relative 07/31/2021 52     Immat GRANS % 07/31/2021 0     Lymphocytes Relative 07/31/2021 35     Monocytes Relative 07/31/2021 11     Eosinophils Relative 07/31/2021 2     Basophils Relative 07/31/2021 0     Neutrophils Absolute 07/31/2021 5 30     Immature Grans Absolute 07/31/2021 0 03     Lymphocytes Absolute 07/31/2021 3 51     Monocytes Absolute 07/31/2021 1 11     Eosinophils Absolute 07/31/2021 0 16     Basophils Absolute 07/31/2021 0 04     Magnesium 07/31/2021 2 1     Sodium 07/31/2021 137     Potassium 07/31/2021 3 6     Chloride 07/31/2021 104     CO2 07/31/2021 28     ANION GAP 07/31/2021 5     BUN 07/31/2021 19     Creatinine 07/31/2021 1 22     Glucose 07/31/2021 155*    Calcium 07/31/2021 9 0     eGFR 07/31/2021 61     WBC 07/31/2021 10 90*    RBC 07/31/2021 4 57     Hemoglobin 07/31/2021 13 0     Hematocrit 07/31/2021 41 3     MCV 07/31/2021 90     MCH 07/31/2021 28 4     MCHC 07/31/2021 31 5     RDW 07/31/2021 13 9     Platelets 90/00/6475 209     MPV 07/31/2021 9 7  Protime 2021 13 1     INR 2021 0 99     PTT 2021 27     Protime 2021 12 7     INR 2021 0 95     POC Glucose 2021 230*    Activated Clotting Time,* 2021 255*    Specimen Type 2021 VENOUS     PTT 2021 51*    POC Glucose 2021 198*    POC Glucose 2021 232*    PTT 2021 165*    PTT 2021 98*    POC Glucose 2021 197*       Echo complete with contrast if indicated    Result Date: 2021  Narrative: [address], [address] [facility] [phone] Transthoracic Echocardiogram Limited 2D, Doppler, and Color Doppler Study date:  2021 Patient: Juan Miguel Maurice MR number: [id number] Account number: [de-identified] : 1955 Age: 77 years Gender: Male Status: Inpatient Location: Bedside Height: 73 in Weight: 193 lb BP: 117/ 70 mmHg Indications: Suspected Interatrial Thrombus Diagnoses: I23 6 - Thrombosis of atrium, auricular appendage, and ventricle as current complications following acute my Sonographer:  Kalpana Louis RDCS Primary Physician:  Reinier Quintana MD Referring Physician:  Kalpana Louis MD Group:  [id number] Cardiology Fellow: Tahira Sanchez MD Interpreting Physician:  Kalpana Louis MD SUMMARY ATRIAL SEPTUM: There was a possible, medium-sized, spherical, echogenic, mobile mass on the right side of the interatrial septum  This may represent possible liopmatous hypertrophy but cannot rule out other etiologies  RECOMMENDATIONS: If clinically indicated, transesophageal echocardiography could provide additional information  HISTORY: PRIOR HISTORY: DM2, HTN, HLD, Cardiac Arrest PROCEDURE: The procedure was performed at the bedside  This was a routine study  The transthoracic approach was used  The study included limited 2D imaging, limited spectral Doppler, and color Doppler  The heart rate was 79 bpm, at the start of the study   Images were obtained from the parasternal, apical, and subcostal acoustic windows  Image quality was adequate  ATRIAL SEPTUM: There was a possible, medium-sized, spherical, echogenic, mobile mass on the right side of the interatrial septum  This may represent possible liopmatous hypertrophy but cannot rule out other etiologies  [address] Accredited Echocardiography Laboratory Prepared and electronically signed by Dana Rangel MD Signed 2021 12:03:49     Echo complete with contrast if indicated    Result Date: 2021  Narrative: [address] [facility], [facility] [phone] Transthoracic Echocardiogram 2D, M-mode, Doppler, and Color Doppler Study date:  2021 Patient: Carter Garcia MR number: [id number] Account number: [de-identified] : 1955 Age: 77 years Gender: Male Status: Inpatient Location: Emergency room Height: 73 in Weight: 200 lb BP: 142/ 68 mmHg Indications: Cardiac Arrest Diagnoses: I46 9 - Cardiac arrest, cause unspecified Sonographer:  Dana Rangel RDCS Primary Physician:  Demetrius Quijano MD Referring Physician:  Fabiano Youngblood MD Group:  [facility] Interpreting Physician:  Kirk Amaral MD SUMMARY LEFT VENTRICLE: Systolic function was normal  Ejection fraction was estimated to be 60 %  There were no regional wall motion abnormalities  ATRIAL SEPTUM: There was a possible, medium-sized, spherical, echogenic, mobile mass on the right side of the interatrial septum  This was only visualized on one image  Repeat imaging with Definity recommended for further evaluation and distinction for artifact  HISTORY: PRIOR HISTORY: No Cardiac History PROCEDURE: The procedure was performed in the emergency room  This was a routine study  The transthoracic approach was used  The study included complete 2D imaging, M-mode, complete spectral Doppler, and color Doppler   The heart rate was 97 bpm, at the start of the study  Images were obtained from the parasternal, apical, subcostal, and suprasternal notch acoustic windows  Image quality was adequate  LEFT VENTRICLE: Size was normal  Systolic function was normal  Ejection fraction was estimated to be 60 %  There were no regional wall motion abnormalities  Wall thickness was normal  DOPPLER: The transmitral flow pattern was normal  Left ventricular diastolic function parameters were normal  RIGHT VENTRICLE: The size was normal  Systolic function was normal  Wall thickness was normal  LEFT ATRIUM: Size was normal  ATRIAL SEPTUM: There was a possible, medium-sized, spherical, echogenic, mobile mass on the right side of the interatrial septum  This was only visualized on one image  Repeat imaging with Definity recommended for further evaluation and distinction for artifact  RIGHT ATRIUM: Size was normal  MITRAL VALVE: Valve structure was normal  There was normal leaflet separation  DOPPLER: The transmitral velocity was within the normal range  There was no evidence for stenosis  There was trace regurgitation  AORTIC VALVE: The valve was trileaflet  Leaflets exhibited normal thickness and normal cuspal separation  DOPPLER: Transaortic velocity was within the normal range  There was no evidence for stenosis  There was no significant regurgitation  TRICUSPID VALVE: The valve structure was normal  There was normal leaflet separation  DOPPLER: The transtricuspid velocity was within the normal range  There was no evidence for stenosis  There was trace regurgitation  Pulmonary artery systolic pressure was within the normal range  Estimated peak PA pressure was 24 mmHg  PULMONIC VALVE: Leaflets exhibited normal thickness, no calcification, and normal cuspal separation  DOPPLER: The transpulmonic velocity was within the normal range  There was trace regurgitation  PERICARDIUM: There was no pericardial effusion  The pericardium was normal in appearance  AORTA: The root exhibited normal size  SYSTEMIC VEINS: IVC: The inferior vena cava was normal in size  Respirophasic changes were normal  SYSTEM MEASUREMENT TABLES 2D %FS: 24 31 % Ao Diam: 3 3 cm Ao asc: 3 19 cm EDV(Teich): 99 96 ml EF(Teich): 48 36 % ESV(Teich): 51 62 ml IVSd: 1 35 cm LA Diam: 3 54 cm LAAs A4C: 15 57 cm2 LAESV A-L A4C: 43 45 ml LAESV MOD A4C: 39 7 ml LALs A4C: 4 74 cm LVEDV MOD A4C: 55 01 ml LVEF MOD A4C: 61 11 % LVESV MOD A4C: 21 4 ml LVIDd: 4 65 cm LVIDs: 3 52 cm LVLd A4C: 7 15 cm LVLs A4C: 6 5 cm LVPWd: 0 94 cm RAEDV A-L: 22 22 ml RAEDV MOD: 21 42 ml RALd: 4 43 cm RVIDd: 3 17 cm SV MOD A4C: 33 62 ml SV(Teich): 48 33 ml CW TR Vmax: 2 28 m/s TR maxP 73 mmHg MM TAPSE: 2 22 cm PW E' Sept: 0 09 m/s LVOT Env  Ti: 237 87 ms LVOT VTI: 14 93 cm LVOT Vmax: 0 85 m/s LVOT Vmean: 0 63 m/s LVOT maxP 95 mmHg LVOT meanP 78 mmHg [employer] Accredited Echocardiography Laboratory Prepared and electronically signed by Stephanie Guerrero MD Signed 2021 09:53:09     XR chest 1 view portable    Result Date: 2021  Narrative: CHEST INDICATION:   cp  COMPARISON:  None EXAM PERFORMED/VIEWS:  XR CHEST PORTABLE FINDINGS: Cardiomediastinal silhouette appears unremarkable  The lungs are clear  No pneumothorax or pleural effusion  Osseous structures appear within normal limits for patient age  Impression: No acute cardiopulmonary disease   Workstation performed: [id number]     Cardiac coronary angio/pci    Result Date: 2021  Narrative: [address] [phone] [facility] Invasive Cardiovascular Lab Complete Report Patient: Virl Means MR number: [id number] Account number: [de-identified] Study date: 2021 Gender: Male : 1955 Height: 72 8 in Weight: 193 4 lb BSA: 2 12 mï¾² Allergies: CODEINE Diagnostic Cardiologist:  Kristen Betancur MD Interventional Cardiologist:  Kristen Betancur MD Primary Physician:  Kaley Hunter MD SUMMARY CORONARY CIRCULATION: Proximal LAD: There was a 50 % stenosis at the origin of D1  1st diagonal: There was a tubular 50 % stenosis  Mid circumflex: There was a 100 % stenosis  This lesion is a chronic total occlusion  1st obtuse marginal: There was a discrete 99 % stenosis  The lesion was complex and eccentric  There was RAMBO grade 2 flow through the vessel (partial perfusion)  This is a likely culprit for the patient's clinical presentation  An intervention was performed  Proximal RCA: There was a tubular 50 % stenosis  Right PDA: There was a 50 % stenosis  1ST LESION INTERVENTIONS: A successful balloon angioplasty with stent procedure was performed on the 99 % lesion in the 1st obtuse marginal  Following intervention there was an excellent angiographic appearance with a 0 % residual stenosis  A Resolute [place] Rx 3 5 x 12mm drug-eluting stent was placed across the lesion and deployed at a maximum inflation pressure of 12 clarice  A Resolute Prashanth Rx 3 5 x 08mm drug-eluting stent was placed across the lesion and deployed at a maximum inflation pressure of 12 clarice  INDICATIONS: --  Possible CAD: myocardial infarction without ST elevation (NSTEMI)  PROCEDURES PERFORMED --  Left heart catheterization without ventriculogram  --  Left coronary angiography  --  Right coronary angiography  --  Inpatient  --  Mod Sedation Same Physician Initial 15min  --  Coronary Catheterization (w/ LHC)  --  Mod Sedation Same Physician Add 15min  --  Mod Sedation Same Physician Add 15min  --  Coronary Drug Eluting Stent w/PTCA  --  Intervention on OM1: balloon angioplasty, stent  PROCEDURE: The risks and alternatives of the procedures and conscious sedation were explained to the patient and informed consent was obtained  The patient was brought to the cath lab and placed on the table  The planned puncture sites were prepped and draped in the usual sterile fashion  --  Right radial artery access   After performing an Valdez's test to verify adequate ulnar artery supply to the hand, the radial site was prepped  The puncture site was infiltrated with local anesthetic  The vessel was accessed using the modified Seldinger technique, a wire was advanced into the vessel, and a sheath was advanced over the wire into the vessel  --  Left heart catheterization without ventriculogram  A catheter was advanced over a guidewire into the ascending aorta  After recording ascending aortic pressure, the catheter was advanced across the aortic valve and left ventricular pressure was recorded  The catheter was pulled back across the aortic valve and into the ascending aorta and pullback pressures were obtained  --  Left coronary artery angiography  A catheter was advanced over a guidewire into the aorta and positioned in the left coronary artery ostium under fluoroscopic guidance  Angiography was performed  --  Right coronary artery angiography  A catheter was advanced over a guidewire into the aorta and positioned in the right coronary artery ostium under fluoroscopic guidance  Angiography was performed  --  Inpatient  --  Mod Sedation Same Physician Initial 15min  --  Coronary Catheterization (w/ LHC)  --  Mod Sedation Same Physician Add 15min  --  Mod Sedation Same Physician Add 15min  LESION INTERVENTION: A successful balloon angioplasty with stent procedure was performed on the 99 % lesion in the 1st obtuse marginal  Following intervention there was an excellent angiographic appearance with a 0 % residual stenosis  There was RAMBO 2 flow before the procedure and RAMBO 3 flow after the procedure  There was no dissection  --  Vessel setup was performed  A 6Fr  Launcher Ebu 3 75 guiding catheter was used to cannulate the vessel  --  Vessel setup was performed  A Runthrough NS 180cm wire was used to cross the lesion  --  Balloon angioplasty was performed, using a [place] Rx 2 5 x 15mm balloon, with 1 inflations and a maximum inflation pressure of 8 clarice   --  A Resolute [place] Rx 3 5 x 12mm drug-eluting stent was placed across the lesion and deployed at a maximum inflation pressure of 12 clarice  --  A Resolute [place] Rx 3 5 x 08mm drug-eluting stent was placed across the lesion and deployed at a maximum inflation pressure of 12 clarice  INTERVENTIONS: --  Coronary Drug Eluting Stent w/PTCA  PROCEDURE COMPLETION: The patient tolerated the procedure well and was discharged from the cath lab  TIMING: Test started at 15:56  Test concluded at 17:07  HEMOSTASIS: The sheath was removed  The site was compressed with a Hemoband device  Hemostasis was obtained  MEDICATIONS GIVEN: Versed (2mg/2ml), 2 mg, IV, at 15:56  Fentanyl (1OOmcg/2 ml), 50 mcg, IV, at 15:56  Versed (2mg/2ml), 1 mg, IV, at 16:05  Fentanyl (1OOmcg/2 ml), 50 mcg, IV, at 16:05  1% Lidocaine, 1 ml, subcutaneously, at 16:05  Heparin 1000 units/ml, 4,000 units, IV, at 16:09  Verapamil (5mg/2ml), 2 5 mg, IV, at 16:09  Nitroglycerin (200mcg/ml), 200 mcg, at 16:09  Heparin 1000 units/ml, 5,000 units, IV, at 16:28  Ticagrelor, 180 mg, PO, at 16:29  Heparin 1000 units/ml, 3,000 units, IV, at 16:38  Nitro Paste, 1, topically, at 16:49, in  Nitroglycerine (200mcg/ml), 200 mcg, at 16:49  CONTRAST GIVEN: 80 ml Omnipaque (350mg I /ml)  100 ml Omnipaque (350mg I /ml)  20 ml Omnipaque (350mg I /ml)  RADIATION EXPOSURE: Fluoroscopy time: 20 53 min  HEMODYNAMICS: Hemodynamic assessment demonstrated normal LVEDP  CORONARY VESSELS:   --  The coronary circulation is right dominant  --  Left main: 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  --  LAD: The vessel was medium sized  Angiography showed moderate atherosclerosis  --  Proximal LAD: There was a 50 % stenosis at the origin of D1  --  1st diagonal: The vessel was small sized  There was a tubular 50 % stenosis  --  Circumflex: The vessel was large sized  --  Mid circumflex: There was a 100 % stenosis  This lesion is a chronic total occlusion   --  1st obtuse marginal: There was a discrete 99 % stenosis  The lesion was complex and eccentric  There was RAMBO grade 2 flow through the vessel (partial perfusion)  This is a likely culprit for the patient's clinical presentation  An intervention was performed  --  2nd obtuse marginal: The vessel was small sized  The artery was supplied by collaterals  The distal vessel was supplied by limited collaterals from the first obtuse marginal  --  RCA: The vessel was medium sized  Angiography showed mild atherosclerosis  --  Proximal RCA: There was a tubular 50 % stenosis  --  Distal RCA: There was a 30 % stenosis  --  Right PDA: There was a 50 % stenosis  IMPRESSIONS: There is significant triple vessel coronary artery disease  RECOMMENDATIONS Patient management should include increasing lipid lowering therapy  Patient management to include dual antiplatelet therapy  DISPOSITION: The patient left the catheterization laboratory in stable condition  Prepared and signed by Rosa Maria Vences MD Signed 07/30/2021 17:19:12 Study diagram Angiographic findings Native coronary lesions: ï¾·Proximal LAD: Lesion 1: 50 % stenosis  ï¾·D1: Lesion 1: tubular, 50 % stenosis  ï¾·Mid circumflex: Lesion 1: 100 % stenosis  ï¾·OM1: Lesion 1: discrete, 99 % stenosis  ï¾·Proximal RCA: Lesion 1: tubular, 50 % stenosis  ï¾·Distal RCA: Lesion 1: 30 % stenosis  ï¾·RPDA: Lesion 1: 50 % stenosis  Intervention results Native coronary lesions: ï¾·Successful balloon angioplasty and stent of the 99 % stenosis in OM1  Appearance excellent with 0 % residual stenosis  Stent: Resolute Prashanth Rx 3 5 x 12mm drug-eluting  Stent: Resolute Prashanth Rx 3 5 x 08mm drug-eluting   Hemodynamic tables Pressures:  Baseline Pressures:  - HR: 87 Pressures:  - Rhythm: Pressures:  -- Aortic Pressure (S/D/M): 121/67/96 Pressures:  -- Left Ventricle (s/edp): 122/15/-- Outputs:  Baseline Outputs:  -- CALCULATIONS: [age] Outputs:  -- CALCULATIONS: Body Surface Area: 2 12 Outputs:  -- CALCULATIONS: Height in cm: 185 00 Outputs:  -- CALCULATIONS: Sex: Male Outputs:  -- CALCULATIONS: Weight in k 90     CTA ED chest PE Study    Result Date: 2021  Narrative: CTA - CHEST WITH IV CONTRAST - PULMONARY ANGIOGRAM INDICATION:   cardiac arrest, new afib  "[facility]  4 rounds of cpr and one shock from AED  Pt is a/o x4  Pt is c/o chest pain and feels diaphoretic" COMPARISON: None  TECHNIQUE: CTA examination of the chest was performed using angiographic technique according to a protocol specifically tailored to evaluate for pulmonary embolism  Axial, sagittal, and coronal 2D reformatted images were created from the source data and  submitted for interpretation  In addition, coronal 3D MIP postprocessing was performed on the acquisition scanner  Radiation dose length product (DLP) for this visit:  622 mGy-cm   This examination, like all CT scans performed in the [facility], was performed utilizing techniques to minimize radiation dose exposure, including the use of iterative reconstruction and automated exposure control  IV Contrast:  85 mL of iohexol (OMNIPAQUE)  FINDINGS: PULMONARY ARTERIAL TREE:  No pulmonary embolus is seen  LUNGS:  Hypoventilatory changes in the dependent portions of the lung bases without other acute findings  No endotracheal or endobronchial lesion  PLEURA:  Unremarkable  HEART/GREAT VESSELS:  Coronary artery calcifications  Cardiomegaly  No thoracic aortic aneurysm  MEDIASTINUM AND WILIAM:  Unremarkable  CHEST WALL AND LOWER NECK:   Unremarkable  VISUALIZED STRUCTURES IN THE UPPER ABDOMEN:  Heterogeneous and fatty infiltration of the partially imaged liver  OSSEOUS STRUCTURES:  No acute fracture or destructive osseous lesion  Impression: No pulmonary arterial embolism  No pulmonary infiltrate/consolidation or pulmonary contusion   Workstation performed: [id number]       EKG personally reviewed by Toya Patterson MD      Counseling / Coordination of Care  Total floor / unit time spent today 30 minutes  Greater than 50% of total time was spent with the patient and / or family counseling and / or coordination of care

## 2021-08-01 NOTE — ASSESSMENT & PLAN NOTE
Lab Results   Component Value Date    HGBA1C 7 6 (H) 07/29/2021       Recent Labs     07/31/21  1727 07/31/21  2100 08/01/21  0628 08/01/21  1111   POCGLU 198* 232* 197* 225*       Blood Sugar Average: Last 72 hrs:  (P) 216 4   Hold oral meds  Add Lantus q h s    Continue insulin sliding scale

## 2021-08-01 NOTE — ASSESSMENT & PLAN NOTE
Status post cardiac catheterization on 07/30 There is significant triple vessel coronary artery disease, status post DESx 2 and angioplasty  Currently on aspirin, Plavix, statin and beta-blocker

## 2021-08-01 NOTE — ASSESSMENT & PLAN NOTE
There was a possible, medium-sized, spherical, echogenic, mobile mass on the right side of the interatrial septum  Cardiology is following  Plan for EBONY  tomorrow

## 2021-08-01 NOTE — ASSESSMENT & PLAN NOTE
-49-year-old male status post cardiac arrest at a fitness center with return of circulation by bystanders and AED use  No PE on CT scan  Status post cardiac catheterization on 07/30 There is significant triple vessel coronary artery disease, status post DESx 2 and angioplasty  Currently on aspirin, Plavix, statin and beta-blocker  Cardiac echo with EF 60%  Cardiology and EP are following for possible dual-chamber defibrillator placement  Monitor on tele

## 2021-08-01 NOTE — PROGRESS NOTES
[address] [facility]  Progress Note - Johann Hawkins 1955, 77 y o  male MRN: [id number]  Unit/Bed#: [facility] 523-01 Encounter: [id number]  Primary Care Provider: Delroy Stern MD   Date and time admitted to hospital: 7/29/2021  3:04 PM    * Cardiac arrest [facility])  Assessment & Plan  -61-year-old male status post cardiac arrest at a fitness center with return of circulation by bystanders and AED use  No PE on CT scan  Status post cardiac catheterization on 07/30 There is significant triple vessel coronary artery disease, status post DESx 2 and angioplasty  Currently on aspirin, Plavix, statin and beta-blocker  Cardiac echo with EF 60%  Cardiology and EP are following for possible dual-chamber defibrillator placement  Monitor on tele    CAD (coronary artery disease)  Assessment & Plan  Status post cardiac catheterization on 07/30 There is significant triple vessel coronary artery disease, status post DESx 2 and angioplasty  Currently on aspirin, Plavix, statin and beta-blocker    Atrial fibrillation ([address] )  Assessment & Plan  · New onset atrial fibrillation  · Unclear onset as pt arrived to the hospital in atrial fibrillation  ? Cardiology is following  ? On heparin drip    Atrial mass  Assessment & Plan  There was a possible, medium-sized, spherical, echogenic, mobile mass on the right side of the interatrial septum  Cardiology is following  Plan for EBONY  tomorrow    DM2 (diabetes mellitus, type 2) [facility])  Assessment & Plan  Lab Results   Component Value Date    HGBA1C 7 6 (H) 07/29/2021       Recent Labs     07/31/21  1727 07/31/21  2100 08/01/21  0628 08/01/21  1111   POCGLU 198* 232* 197* 225*       Blood Sugar Average: Last 72 hrs:  (P) 216 4   Hold oral meds  Add Lantus q h s    Continue insulin sliding scale    Hypertension  Assessment & Plan  Acceptable BP  Continue Lopressor  Cozaar was added      VTE Pharmacologic Prophylaxis:   heparin drip    Patient Centered Rounds: I performed bedside rounds with nursing staff today  Discussions with Specialists or Other Care Team Provider:     Education and Discussions with Family / Patient: Updated  (wife) at bedside  Time Spent for Care: 45 minutes  More than 50% of total time spent on counseling and coordination of care as described above  Current Length of Stay: 3 day(s)  Current Patient Status: Inpatient   Certification Statement: The patient will continue to require additional inpatient hospital stay due to Above  Discharge Plan: TBD    Code Status: Level 1 - Full Code    Subjective:   Patient seen and examined  Comfortable in bed  No chest pain or shortness of breath  No event overnight    Objective:     Vitals:   Temp (24hrs), Av 4 °F (36 9 °C), Min:98 2 °F (36 8 °C), Max:98 6 °F (37 °C)    Temp:  [98 2 °F (36 8 °C)-98 6 °F (37 °C)] 98 6 °F (37 °C)  HR:  [73-90] 78  Resp:  [18] 18  BP: (114-141)/(62-73) 141/73  SpO2:  [95 %-98 %] 95 %  Body mass index is 25 74 kg/m²  Input and Output Summary (last 24 hours):      Intake/Output Summary (Last 24 hours) at 2021 1138  Last data filed at 2021 0801  Gross per 24 hour   Intake 745 84 ml   Output 1100 ml   Net -354 16 ml       Physical Exam:   Physical Exam     Patient is awake alert oriented no acute distress  Lung clear to auscultation bilateral  Heart positive S1-S2 no murmur  Abdomen soft nontender  Lower extremities no edema    Additional Data:     Labs:  Results from last 7 days   Lab Units 21  0455 21  0313   WBC Thousand/uL 10 90* 10 15   HEMOGLOBIN g/dL 13 0 12 6   HEMATOCRIT % 41 3 38 6   PLATELETS Thousands/uL 209 216   NEUTROS PCT %  --  52   LYMPHS PCT %  --  35   MONOS PCT %  --  11   EOS PCT %  --  2     Results from last 7 days   Lab Units 21  0455 21  1952   SODIUM mmol/L 137 139   POTASSIUM mmol/L 3 6 4 0   CHLORIDE mmol/L 104 105   CO2 mmol/L 28 25   BUN mg/dL 19 22   CREATININE mg/dL 1 22 1 27   ANION GAP mmol/L 5 9   CALCIUM mg/dL 9 0 9 0   ALBUMIN g/dL  --  3 6   TOTAL BILIRUBIN mg/dL  --  0 47   ALK PHOS U/L  --  70   ALT U/L  --  77   AST U/L  --  55*   GLUCOSE RANDOM mg/dL 155* 198*     Results from last 7 days   Lab Units 07/31/21  1108   INR  0 95     Results from last 7 days   Lab Units 08/01/21  1111 08/01/21  0628 07/31/21  2100 07/31/21  1727 07/31/21  1224   POC GLUCOSE mg/dl 225* 197* 232* 198* 230*     Results from last 7 days   Lab Units 07/29/21  1513   HEMOGLOBIN A1C % 7 6*     Results from last 7 days   Lab Units 07/30/21  0524 07/30/21  0046 07/29/21  1952 07/29/21  1724   LACTIC ACID mmol/L 1 4 1 6 2 9* 3 4*       Lines/Drains:  Invasive Devices     Peripheral Intravenous Line            Peripheral IV 07/29/21 Right Antecubital 2 days    Peripheral IV 07/30/21 Distal;Left;Ventral (anterior) Forearm 2 days                  Telemetry:  Telemetry Orders (From admission, onward)             48 Hour Telemetry Monitoring  Continuous x 48 hours     Question:  Reason for 48 Hour Telemetry  Answer:  Arrhythmias Requiring Medical Therapy (eg  SVT, Vtach/fib, Bradycardia, Uncontrolled A-fib)                 Telemetry Reviewed:yes  Indication for Continued Telemetry Use: Arrthymias requiring medical therapy           Imaging: No pertinent imaging reviewed      Recent Cultures (last 7 days):         Last 24 Hours Medication List:   Current Facility-Administered Medications   Medication Dose Route Frequency Provider Last Rate    aspirin  81 mg Oral Daily Eugenie Pan MD      atorvastatin  40 mg Oral Daily With Lisseth Campuzano MD      clopidogrel  75 mg Oral Daily Eugenie Pan MD      DULoxetine  60 mg Oral Daily Eugenie Pan MD      heparin (porcine)  3-20 Units/kg/hr (Order-Specific) Intravenous Titrated Curry Munda, DO 8 1 Units/kg/hr (08/01/21 1003)    heparin (porcine)  2,000 Units Intravenous Q1H PRN Curry Munda, DO      heparin (porcine)  4,000 Units Intravenous Q1H PRN [place] Munda, DO      insulin glargine 10 Units Subcutaneous HS Lila Major DO      insulin lispro  1-5 Units Subcutaneous TID [facility] Lila Major DO      insulin lispro  1-5 Units Subcutaneous HS Lila Major DO      lidocaine  1 patch Topical Daily Deidre Felder MD      loratadine  10 mg Oral Daily Deidre Felder MD      losartan  25 mg Oral Daily Calli Bonilla MD      melatonin  6 mg Oral HS Deidre Felder MD      metoprolol tartrate  50 mg Oral Q12H [address] Calli Bonilla MD      pantoprazole  40 mg Oral Early Morning MD Meenakshi Higuera ON 8/2/2021] vancomycin  1,000 mg Intravenous Once Deidre Felder MD          Today, Patient Was Seen By: Lila Major DO    **Please Note: This note may have been constructed using a voice recognition system  **

## 2021-08-02 ENCOUNTER — APPOINTMENT (INPATIENT)
Dept: NON INVASIVE DIAGNOSTICS | Facility: HOSPITAL | Age: 66
DRG: 246 | End: 2021-08-02
Payer: MEDICARE

## 2021-08-02 DIAGNOSIS — I46.9 CARDIAC ARREST (HCC): Primary | ICD-10-CM

## 2021-08-02 LAB
ANION GAP SERPL CALCULATED.3IONS-SCNC: 6 MMOL/L (ref 4–13)
APTT PPP: 38 SECONDS (ref 23–37)
APTT PPP: 70 SECONDS (ref 23–37)
APTT PPP: 77 SECONDS (ref 23–37)
APTT PPP: >210 SECONDS (ref 23–37)
BASOPHILS # BLD AUTO: 0.06 THOUSANDS/ΜL (ref 0–0.1)
BASOPHILS NFR BLD AUTO: 1 % (ref 0–1)
BUN SERPL-MCNC: 19 MG/DL (ref 5–25)
CALCIUM SERPL-MCNC: 9.1 MG/DL (ref 8.3–10.1)
CHLORIDE SERPL-SCNC: 103 MMOL/L (ref 100–108)
CO2 SERPL-SCNC: 24 MMOL/L (ref 21–32)
CREAT SERPL-MCNC: 1.27 MG/DL (ref 0.6–1.3)
EOSINOPHIL # BLD AUTO: 0.27 THOUSAND/ΜL (ref 0–0.61)
EOSINOPHIL NFR BLD AUTO: 3 % (ref 0–6)
ERYTHROCYTE [DISTWIDTH] IN BLOOD BY AUTOMATED COUNT: 13.6 % (ref 11.6–15.1)
GFR SERPL CREATININE-BSD FRML MDRD: 58 ML/MIN/1.73SQ M
GLUCOSE SERPL-MCNC: 184 MG/DL (ref 65–140)
GLUCOSE SERPL-MCNC: 187 MG/DL (ref 65–140)
GLUCOSE SERPL-MCNC: 195 MG/DL (ref 65–140)
GLUCOSE SERPL-MCNC: 199 MG/DL (ref 65–140)
GLUCOSE SERPL-MCNC: 200 MG/DL (ref 65–140)
HCT VFR BLD AUTO: 41.3 % (ref 36.5–49.3)
HGB BLD-MCNC: 13.5 G/DL (ref 12–17)
IMM GRANULOCYTES # BLD AUTO: 0.04 THOUSAND/UL (ref 0–0.2)
IMM GRANULOCYTES NFR BLD AUTO: 0 % (ref 0–2)
LYMPHOCYTES # BLD AUTO: 3.61 THOUSANDS/ΜL (ref 0.6–4.47)
LYMPHOCYTES NFR BLD AUTO: 39 % (ref 14–44)
MAGNESIUM SERPL-MCNC: 2.2 MG/DL (ref 1.6–2.6)
MCH RBC QN AUTO: 28.5 PG (ref 26.8–34.3)
MCHC RBC AUTO-ENTMCNC: 32.7 G/DL (ref 31.4–37.4)
MCV RBC AUTO: 87 FL (ref 82–98)
MONOCYTES # BLD AUTO: 0.81 THOUSAND/ΜL (ref 0.17–1.22)
MONOCYTES NFR BLD AUTO: 9 % (ref 4–12)
NEUTROPHILS # BLD AUTO: 4.46 THOUSANDS/ΜL (ref 1.85–7.62)
NEUTS SEG NFR BLD AUTO: 48 % (ref 43–75)
NRBC BLD AUTO-RTO: 0 /100 WBCS
PLATELET # BLD AUTO: 220 THOUSANDS/UL (ref 149–390)
PMV BLD AUTO: 9.9 FL (ref 8.9–12.7)
POTASSIUM SERPL-SCNC: 3.8 MMOL/L (ref 3.5–5.3)
RBC # BLD AUTO: 4.74 MILLION/UL (ref 3.88–5.62)
SODIUM SERPL-SCNC: 133 MMOL/L (ref 136–145)
WBC # BLD AUTO: 9.25 THOUSAND/UL (ref 4.31–10.16)

## 2021-08-02 PROCEDURE — 93320 DOPPLER ECHO COMPLETE: CPT | Performed by: INTERNAL MEDICINE

## 2021-08-02 PROCEDURE — 93325 DOPPLER ECHO COLOR FLOW MAPG: CPT | Performed by: INTERNAL MEDICINE

## 2021-08-02 PROCEDURE — 85025 COMPLETE CBC W/AUTO DIFF WBC: CPT | Performed by: INTERNAL MEDICINE

## 2021-08-02 PROCEDURE — NC001 PR NO CHARGE: Performed by: INTERNAL MEDICINE

## 2021-08-02 PROCEDURE — 80048 BASIC METABOLIC PNL TOTAL CA: CPT | Performed by: INTERNAL MEDICINE

## 2021-08-02 PROCEDURE — 93312 ECHO TRANSESOPHAGEAL: CPT

## 2021-08-02 PROCEDURE — 99233 SBSQ HOSP IP/OBS HIGH 50: CPT | Performed by: INTERNAL MEDICINE

## 2021-08-02 PROCEDURE — 93312 ECHO TRANSESOPHAGEAL: CPT | Performed by: INTERNAL MEDICINE

## 2021-08-02 PROCEDURE — 82948 REAGENT STRIP/BLOOD GLUCOSE: CPT

## 2021-08-02 PROCEDURE — 85730 THROMBOPLASTIN TIME PARTIAL: CPT | Performed by: INTERNAL MEDICINE

## 2021-08-02 PROCEDURE — 83735 ASSAY OF MAGNESIUM: CPT | Performed by: INTERNAL MEDICINE

## 2021-08-02 PROCEDURE — 99232 SBSQ HOSP IP/OBS MODERATE 35: CPT | Performed by: INTERNAL MEDICINE

## 2021-08-02 RX ORDER — METOCLOPRAMIDE HYDROCHLORIDE 5 MG/ML
10 INJECTION INTRAMUSCULAR; INTRAVENOUS ONCE AS NEEDED
Status: CANCELLED | OUTPATIENT
Start: 2021-08-02

## 2021-08-02 RX ORDER — ONDANSETRON 2 MG/ML
4 INJECTION INTRAMUSCULAR; INTRAVENOUS ONCE AS NEEDED
Status: CANCELLED | OUTPATIENT
Start: 2021-08-02

## 2021-08-02 RX ORDER — LIDOCAINE HYDROCHLORIDE 10 MG/ML
0.5 INJECTION, SOLUTION EPIDURAL; INFILTRATION; INTRACAUDAL; PERINEURAL ONCE AS NEEDED
Status: CANCELLED | OUTPATIENT
Start: 2021-08-02

## 2021-08-02 RX ORDER — PROPOFOL 10 MG/ML
INJECTION, EMULSION INTRAVENOUS AS NEEDED
Status: DISCONTINUED | OUTPATIENT
Start: 2021-08-02 | End: 2021-08-02

## 2021-08-02 RX ORDER — LIDOCAINE HYDROCHLORIDE 10 MG/ML
INJECTION, SOLUTION EPIDURAL; INFILTRATION; INTRACAUDAL; PERINEURAL AS NEEDED
Status: DISCONTINUED | OUTPATIENT
Start: 2021-08-02 | End: 2021-08-02

## 2021-08-02 RX ORDER — PROPOFOL 10 MG/ML
INJECTION, EMULSION INTRAVENOUS CONTINUOUS PRN
Status: DISCONTINUED | OUTPATIENT
Start: 2021-08-02 | End: 2021-08-02

## 2021-08-02 RX ORDER — SODIUM CHLORIDE 9 MG/ML
INJECTION, SOLUTION INTRAVENOUS CONTINUOUS PRN
Status: DISCONTINUED | OUTPATIENT
Start: 2021-08-02 | End: 2021-08-02

## 2021-08-02 RX ORDER — DIPHENHYDRAMINE HYDROCHLORIDE 50 MG/ML
12.5 INJECTION INTRAMUSCULAR; INTRAVENOUS ONCE AS NEEDED
Status: CANCELLED | OUTPATIENT
Start: 2021-08-02

## 2021-08-02 RX ADMIN — ATORVASTATIN CALCIUM 40 MG: 40 TABLET, FILM COATED ORAL at 15:51

## 2021-08-02 RX ADMIN — CLOPIDOGREL BISULFATE 75 MG: 75 TABLET ORAL at 08:09

## 2021-08-02 RX ADMIN — PROPOFOL 20 MG: 10 INJECTION, EMULSION INTRAVENOUS at 13:50

## 2021-08-02 RX ADMIN — SODIUM CHLORIDE: 0.9 INJECTION, SOLUTION INTRAVENOUS at 13:25

## 2021-08-02 RX ADMIN — HEPARIN SODIUM 12.1 UNITS/KG/HR: 10000 INJECTION, SOLUTION INTRAVENOUS at 17:29

## 2021-08-02 RX ADMIN — LOSARTAN POTASSIUM 25 MG: 25 TABLET, FILM COATED ORAL at 08:10

## 2021-08-02 RX ADMIN — LIDOCAINE 1 PATCH: 50 PATCH TOPICAL at 08:11

## 2021-08-02 RX ADMIN — LORATADINE 10 MG: 10 TABLET ORAL at 08:10

## 2021-08-02 RX ADMIN — DULOXETINE HYDROCHLORIDE 60 MG: 60 CAPSULE, DELAYED RELEASE ORAL at 08:10

## 2021-08-02 RX ADMIN — LIDOCAINE HYDROCHLORIDE 100 MG: 10 INJECTION, SOLUTION EPIDURAL; INFILTRATION; INTRACAUDAL; PERINEURAL at 13:45

## 2021-08-02 RX ADMIN — INSULIN LISPRO 1 UNITS: 100 INJECTION, SOLUTION INTRAVENOUS; SUBCUTANEOUS at 12:42

## 2021-08-02 RX ADMIN — INSULIN LISPRO 1 UNITS: 100 INJECTION, SOLUTION INTRAVENOUS; SUBCUTANEOUS at 17:17

## 2021-08-02 RX ADMIN — ASPIRIN 81 MG CHEWABLE TABLET 81 MG: 81 TABLET CHEWABLE at 08:10

## 2021-08-02 RX ADMIN — PROPOFOL 100 MG: 10 INJECTION, EMULSION INTRAVENOUS at 13:46

## 2021-08-02 RX ADMIN — MELATONIN TAB 3 MG 6 MG: 3 TAB at 22:09

## 2021-08-02 RX ADMIN — PANTOPRAZOLE SODIUM 40 MG: 40 TABLET, DELAYED RELEASE ORAL at 06:13

## 2021-08-02 RX ADMIN — PROPOFOL 110 MCG/KG/MIN: 10 INJECTION, EMULSION INTRAVENOUS at 13:46

## 2021-08-02 RX ADMIN — METOPROLOL TARTRATE 50 MG: 50 TABLET, FILM COATED ORAL at 22:09

## 2021-08-02 RX ADMIN — INSULIN LISPRO 1 UNITS: 100 INJECTION, SOLUTION INTRAVENOUS; SUBCUTANEOUS at 22:09

## 2021-08-02 RX ADMIN — INSULIN GLARGINE 10 UNITS: 100 INJECTION, SOLUTION SUBCUTANEOUS at 22:09

## 2021-08-02 RX ADMIN — HEPARIN SODIUM 4000 UNITS: 1000 INJECTION INTRAVENOUS; SUBCUTANEOUS at 17:30

## 2021-08-02 RX ADMIN — METOPROLOL TARTRATE 50 MG: 50 TABLET, FILM COATED ORAL at 08:10

## 2021-08-02 RX ADMIN — INSULIN LISPRO 1 UNITS: 100 INJECTION, SOLUTION INTRAVENOUS; SUBCUTANEOUS at 08:08

## 2021-08-02 NOTE — ASSESSMENT & PLAN NOTE
Lab Results   Component Value Date    HGBA1C 7 6 (H) 07/29/2021       Recent Labs     08/01/21  1111 08/01/21  1615 08/01/21  2105 08/02/21  0604   POCGLU 225* 224* 220* 200*       Blood Sugar Average: Last 72 hrs:  (P) 215 75   Hold oral meds  Continue Lantus q h s    Continue insulin sliding scale

## 2021-08-02 NOTE — ANESTHESIA PREPROCEDURE EVALUATION
Procedure:  EBONY    Relevant Problems   ANESTHESIA (within normal limits)      CARDIO   (+) Atrial fibrillation (HCC)   (+) CAD (coronary artery disease)   (+) Cardiac arrest (HCC)   (+) Hyperlipidemia   (+) Hypertension      ENDO   (+) DM2 (diabetes mellitus, type 2) (HCC)      GI/HEPATIC (within normal limits)      /RENAL (within normal limits)      HEMATOLOGY (within normal limits)      MUSCULOSKELETAL (within normal limits)      NEURO/PSYCH (within normal limits)      PULMONARY (within normal limits)      Other   (+) Atrial mass        Physical Exam    Airway    Mallampati score: II  TM Distance: >3 FB  Neck ROM: full     Dental   No notable dental hx     Cardiovascular  Rhythm: irregular, Rate: normal, Cardiovascular exam normal    Pulmonary  Pulmonary exam normal Breath sounds clear to auscultation,     Other Findings        Anesthesia Plan  ASA Score- 3     Anesthesia Type- IV sedation with anesthesia with ASA Monitors  Additional Monitors:   Airway Plan:           Plan Factors-    Chart reviewed  Existing labs reviewed  Patient summary reviewed  Induction- intravenous  Postoperative Plan-     Informed Consent- Anesthetic plan and risks discussed with patient  I personally reviewed this patient with the CRNA  Discussed and agreed on the Anesthesia Plan with the CRNA  Lillie Hicks

## 2021-08-02 NOTE — ANESTHESIA POSTPROCEDURE EVALUATION
Post-Op Assessment Note    CV Status:  Stable  Pain Score: 0    Pain management: adequate     Mental Status:  Alert and awake   Hydration Status:  Euvolemic   PONV Controlled:  Controlled   Airway Patency:  Patent      Post Op Vitals Reviewed: Yes      Staff: CRNA         No complications documented      BP   110/59   Temp      Pulse  65   Resp   16   SpO2   98

## 2021-08-02 NOTE — PROGRESS NOTES
Cardiology Progress Note - David Kinney 77 y o  male MRN: [id number]    Unit/Bed#: [facility] 523-01 Encounter: [id number]      Assessment:  Principal Problem:    Cardiac arrest [facility])  Active Problems:    Hypertension    Hyperlipidemia    DM2 (diabetes mellitus, type 2) (HCC)    Atrial fibrillation (HCC)    CAD (coronary artery disease)    Atrial mass      Plan:  Patient is comfortable  He has no chest pain or significant dyspnea  Scheduled for EBONY today to better visualize interatrial septum  Continues in sinus rhythm  NPO as well for possible device placement pending EP evaluation for need for this  BMP today with potassium of 3 8 and creatinine of 1 27  Hemoglobin 13 5  No further atrial fibrillation  Will need aggressive goal-directed therapy for coronary artery disease given multiple vessel involvement  Blood pressure improved today with losartan  Subjective:   Patient seen and examined  No significant events overnight   negative  Objective:     Vitals: Blood pressure 130/63, pulse 105, temperature 98 2 °F (36 8 °C), resp  rate 22, weight 89 4 kg (197 lb 1 5 oz), SpO2 94 %  , Body mass index is 26 kg/m² ,   Orthostatic Blood Pressures      Most Recent Value   Blood Pressure  130/63 filed at 08/02/2021 7086   Patient Position - Orthostatic VS  Lying filed at 08/02/2021 0738      ,      Intake/Output Summary (Last 24 hours) at 8/2/2021 0844  Last data filed at 8/2/2021 0813  Gross per 24 hour   Intake 340 ml   Output 1600 ml   Net -1260 ml       No significant arrhythmias seen on telemetry review         Physical Exam:    GEN: Willis Patino appears well, alert and oriented x 3, pleasant and cooperative   NECK: supple, no carotid bruits, no JVD or HJR  HEART: normal rate, regular rhythm, normal S1 and S2, no murmurs, clicks, gallops or rubs   LUNGS: clear to auscultation bilaterally; no wheezes, rales, or rhonchi   ABDOMEN: normal bowel sounds, soft, no tenderness, no distention  EXTREMITIES: peripheral pulses normal; no clubbing, cyanosis, or edema  SKIN: warm and well perfused, no suspicious lesions on exposed skin    Labs & Results:    No results displayed because visit has over 200 results  Echo complete with contrast if indicated    Result Date: 2021  Narrative: Thnah Cabrera, [address] [facility] [phone] Transthoracic Echocardiogram Limited 2D, Doppler, and Color Doppler Study date:  2021 Patient: Surjit Iqbal MR number: [id number] Account number: [de-identified] : 1955 Age: 77 years Gender: Male Status: Inpatient Location: Bedside Height: 73 in Weight: 193 lb BP: 117/ 70 mmHg Indications: Suspected Interatrial Thrombus Diagnoses: I23 6 - Thrombosis of atrium, auricular appendage, and ventricle as current complications following acute my Sonographer:  Francisca Nevarez RDCS Primary Physician:  Saul Peck MD Referring Physician:  Francisca Nevarez MD Group:  [facility] [id number] Cardiology Fellow: Oralia Rick MD Interpreting Physician:  Francisca Nevarez MD SUMMARY ATRIAL SEPTUM: There was a possible, medium-sized, spherical, echogenic, mobile mass on the right side of the interatrial septum  This may represent possible liopmatous hypertrophy but cannot rule out other etiologies  RECOMMENDATIONS: If clinically indicated, transesophageal echocardiography could provide additional information  HISTORY: PRIOR HISTORY: DM2, HTN, HLD, Cardiac Arrest PROCEDURE: The procedure was performed at the bedside  This was a routine study  The transthoracic approach was used  The study included limited 2D imaging, limited spectral Doppler, and color Doppler  The heart rate was 79 bpm, at the start of the study  Images were obtained from the parasternal, apical, and subcostal acoustic windows  Image quality was adequate   ATRIAL SEPTUM: There was a possible, medium-sized, spherical, echogenic, mobile mass on the right side of the interatrial septum  This may represent possible liopmatous hypertrophy but cannot rule out other etiologies  [address] Accredited Echocardiography Laboratory Prepared and electronically signed by Eloina Cerrato MD Signed 2021 12:03:49     Echo complete with contrast if indicated    Result Date: 2021  Narrative: Thanh [address] [place] [address] [phone] Transthoracic Echocardiogram 2D, M-mode, Doppler, and Color Doppler Study date:  2021 Patient: Dwayne Murdock MR number: [id number] Account number: [de-identified] : 1955 Age: 77 years Gender: Male Status: Inpatient Location: Emergency room Height: 73 in Weight: 200 lb BP: 142/ 68 mmHg Indications: Cardiac Arrest Diagnoses: I46 9 - Cardiac arrest, cause unspecified Sonographer:  Eloina Cerrato RDCS Primary Physician:  Laura Umana MD Referring Physician:  Harpreet Newman MD Group:  [facility] Interpreting Physician:  Yamila Chappell MD SUMMARY LEFT VENTRICLE: Systolic function was normal  Ejection fraction was estimated to be 60 %  There were no regional wall motion abnormalities  ATRIAL SEPTUM: There was a possible, medium-sized, spherical, echogenic, mobile mass on the right side of the interatrial septum  This was only visualized on one image  Repeat imaging with Definity recommended for further evaluation and distinction for artifact  HISTORY: PRIOR HISTORY: No Cardiac History PROCEDURE: The procedure was performed in the emergency room  This was a routine study  The transthoracic approach was used  The study included complete 2D imaging, M-mode, complete spectral Doppler, and color Doppler  The heart rate was 97 bpm, at the start of the study  Images were obtained from the parasternal, apical, subcostal, and suprasternal notch acoustic windows  Image quality was adequate   LEFT VENTRICLE: Size was normal  Systolic function was normal  Ejection fraction was estimated to be 60 %  There were no regional wall motion abnormalities  Wall thickness was normal  DOPPLER: The transmitral flow pattern was normal  Left ventricular diastolic function parameters were normal  RIGHT VENTRICLE: The size was normal  Systolic function was normal  Wall thickness was normal  LEFT ATRIUM: Size was normal  ATRIAL SEPTUM: There was a possible, medium-sized, spherical, echogenic, mobile mass on the right side of the interatrial septum  This was only visualized on one image  Repeat imaging with Definity recommended for further evaluation and distinction for artifact  RIGHT ATRIUM: Size was normal  MITRAL VALVE: Valve structure was normal  There was normal leaflet separation  DOPPLER: The transmitral velocity was within the normal range  There was no evidence for stenosis  There was trace regurgitation  AORTIC VALVE: The valve was trileaflet  Leaflets exhibited normal thickness and normal cuspal separation  DOPPLER: Transaortic velocity was within the normal range  There was no evidence for stenosis  There was no significant regurgitation  TRICUSPID VALVE: The valve structure was normal  There was normal leaflet separation  DOPPLER: The transtricuspid velocity was within the normal range  There was no evidence for stenosis  There was trace regurgitation  Pulmonary artery systolic pressure was within the normal range  Estimated peak PA pressure was 24 mmHg  PULMONIC VALVE: Leaflets exhibited normal thickness, no calcification, and normal cuspal separation  DOPPLER: The transpulmonic velocity was within the normal range  There was trace regurgitation  PERICARDIUM: There was no pericardial effusion  The pericardium was normal in appearance  AORTA: The root exhibited normal size  SYSTEMIC VEINS: IVC: The inferior vena cava was normal in size   Respirophasic changes were normal  SYSTEM MEASUREMENT TABLES 2D %FS: 24 31 % Ao Diam: 3 3 cm Ao asc: 3 19 cm EDV(Teich): 99 96 ml EF(Teich): 48 36 % ESV(Teich): 51 62 ml IVSd: 1 35 cm LA Diam: 3 54 cm LAAs A4C: 15 57 cm2 LAESV A-L A4C: 43 45 ml LAESV MOD A4C: 39 7 ml LALs A4C: 4 74 cm LVEDV MOD A4C: 55 01 ml LVEF MOD A4C: 61 11 % LVESV MOD A4C: 21 4 ml LVIDd: 4 65 cm LVIDs: 3 52 cm LVLd A4C: 7 15 cm LVLs A4C: 6 5 cm LVPWd: 0 94 cm RAEDV A-L: 22 22 ml RAEDV MOD: 21 42 ml RALd: 4 43 cm RVIDd: 3 17 cm SV MOD A4C: 33 62 ml SV(Teich): 48 33 ml CW TR Vmax: 2 28 m/s TR maxP 73 mmHg MM TAPSE: 2 22 cm PW E' Sept: 0 09 m/s LVOT Env  Ti: 237 87 ms LVOT VTI: 14 93 cm LVOT Vmax: 0 85 m/s LVOT Vmean: 0 63 m/s LVOT maxP 95 mmHg LVOT meanP 78 mmHg [facility] Accredited Echocardiography Laboratory Prepared and electronically signed by Ayde Youssef MD Signed 2021 09:53:09     XR chest 1 view portable    Result Date: 2021  Narrative: CHEST INDICATION:   cp  COMPARISON:  None EXAM PERFORMED/VIEWS:  XR CHEST PORTABLE FINDINGS: Cardiomediastinal silhouette appears unremarkable  The lungs are clear  No pneumothorax or pleural effusion  Osseous structures appear within normal limits for patient age  Impression: No acute cardiopulmonary disease  Workstation performed: [id number]     Cardiac coronary angio/pci    Result Date: 2021  Narrative: Thanh [address] [phone] [facility] Invasive Cardiovascular Lab Complete Report Patient: Hira Alexander MR number: [id number] Account number: [de-identified] Study date: 2021 Gender: Male : 1955 Height: 72 8 in Weight: 193 4 lb BSA: 2 12 mï¾² Allergies: CODEINE Diagnostic Cardiologist:  Gopi Hooks MD Interventional Cardiologist:  Gopi Hooks MD Primary Physician:  Balta Keating MD SUMMARY CORONARY CIRCULATION: Proximal LAD: There was a 50 % stenosis at the origin of D1  1st diagonal: There was a tubular 50 % stenosis  Mid circumflex: There was a 100 % stenosis   This lesion is a chronic total occlusion  1st obtuse marginal: There was a discrete 99 % stenosis  The lesion was complex and eccentric  There was RAMBO grade 2 flow through the vessel (partial perfusion)  This is a likely culprit for the patient's clinical presentation  An intervention was performed  Proximal RCA: There was a tubular 50 % stenosis  Right PDA: There was a 50 % stenosis  1ST LESION INTERVENTIONS: A successful balloon angioplasty with stent procedure was performed on the 99 % lesion in the 1st obtuse marginal  Following intervention there was an excellent angiographic appearance with a 0 % residual stenosis  A Resolute Prashanth Rx 3 5 x 12mm drug-eluting stent was placed across the lesion and deployed at a maximum inflation pressure of 12 clarice  A Resolute Prashanth Rx 3 5 x 08mm drug-eluting stent was placed across the lesion and deployed at a maximum inflation pressure of 12 clarice  INDICATIONS: --  Possible CAD: myocardial infarction without ST elevation (NSTEMI)  PROCEDURES PERFORMED --  Left heart catheterization without ventriculogram  --  Left coronary angiography  --  Right coronary angiography  --  Inpatient  --  Mod Sedation Same Physician Initial 15min  --  Coronary Catheterization (w/ LHC)  --  Mod Sedation Same Physician Add 15min  --  Mod Sedation Same Physician Add 15min  --  Coronary Drug Eluting Stent w/PTCA  --  Intervention on OM1: balloon angioplasty, stent  PROCEDURE: The risks and alternatives of the procedures and conscious sedation were explained to the patient and informed consent was obtained  The patient was brought to the cath lab and placed on the table  The planned puncture sites were prepped and draped in the usual sterile fashion  --  Right radial artery access  After performing an Valdez's test to verify adequate ulnar artery supply to the hand, the radial site was prepped  The puncture site was infiltrated with local anesthetic   The vessel was accessed using the modified Seldinger technique, a wire was advanced into the vessel, and a sheath was advanced over the wire into the vessel  --  Left heart catheterization without ventriculogram  A catheter was advanced over a guidewire into the ascending aorta  After recording ascending aortic pressure, the catheter was advanced across the aortic valve and left ventricular pressure was recorded  The catheter was pulled back across the aortic valve and into the ascending aorta and pullback pressures were obtained  --  Left coronary artery angiography  A catheter was advanced over a guidewire into the aorta and positioned in the left coronary artery ostium under fluoroscopic guidance  Angiography was performed  --  Right coronary artery angiography  A catheter was advanced over a guidewire into the aorta and positioned in the right coronary artery ostium under fluoroscopic guidance  Angiography was performed  --  Inpatient  --  Mod Sedation Same Physician Initial 15min  --  Coronary Catheterization (w/ LHC)  --  Mod Sedation Same Physician Add 15min  --  Mod Sedation Same Physician Add 15min  LESION INTERVENTION: A successful balloon angioplasty with stent procedure was performed on the 99 % lesion in the 1st obtuse marginal  Following intervention there was an excellent angiographic appearance with a 0 % residual stenosis  There was RAMBO 2 flow before the procedure and RAMBO 3 flow after the procedure  There was no dissection  --  Vessel setup was performed  A 6Fr  Launcher Ebu 3 75 guiding catheter was used to cannulate the vessel  --  Vessel setup was performed  A Runthrough NS 180cm wire was used to cross the lesion  --  Balloon angioplasty was performed, using a Euphora Rx 2 5 x 15mm balloon, with 1 inflations and a maximum inflation pressure of 8 clarice  --  A Resolute [place] Rx 3 5 x 12mm drug-eluting stent was placed across the lesion and deployed at a maximum inflation pressure of 12 clarice   --  A Resolute Prashanth Rx 3 5 x 08mm drug-eluting stent was placed across the lesion and deployed at a maximum inflation pressure of 12 clarice  INTERVENTIONS: --  Coronary Drug Eluting Stent w/PTCA  PROCEDURE COMPLETION: The patient tolerated the procedure well and was discharged from the cath lab  TIMING: Test started at 15:56  Test concluded at 17:07  HEMOSTASIS: The sheath was removed  The site was compressed with a Hemoband device  Hemostasis was obtained  MEDICATIONS GIVEN: Versed (2mg/2ml), 2 mg, IV, at 15:56  Fentanyl (1OOmcg/2 ml), 50 mcg, IV, at 15:56  Versed (2mg/2ml), 1 mg, IV, at 16:05  Fentanyl (1OOmcg/2 ml), 50 mcg, IV, at 16:05  1% Lidocaine, 1 ml, subcutaneously, at 16:05  Heparin 1000 units/ml, 4,000 units, IV, at 16:09  Verapamil (5mg/2ml), 2 5 mg, IV, at 16:09  Nitroglycerin (200mcg/ml), 200 mcg, at 16:09  Heparin 1000 units/ml, 5,000 units, IV, at 16:28  Ticagrelor, 180 mg, PO, at 16:29  Heparin 1000 units/ml, 3,000 units, IV, at 16:38  Nitro Paste, 1, topically, at 16:49, in  Nitroglycerine (200mcg/ml), 200 mcg, at 16:49  CONTRAST GIVEN: 80 ml Omnipaque (350mg I /ml)  100 ml Omnipaque (350mg I /ml)  20 ml Omnipaque (350mg I /ml)  RADIATION EXPOSURE: Fluoroscopy time: 20 53 min  HEMODYNAMICS: Hemodynamic assessment demonstrated normal LVEDP  CORONARY VESSELS:   --  The coronary circulation is right dominant  --  Left main: 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  --  LAD: The vessel was medium sized  Angiography showed moderate atherosclerosis  --  Proximal LAD: There was a 50 % stenosis at the origin of D1  --  1st diagonal: The vessel was small sized  There was a tubular 50 % stenosis  --  Circumflex: The vessel was large sized  --  Mid circumflex: There was a 100 % stenosis  This lesion is a chronic total occlusion  --  1st obtuse marginal: There was a discrete 99 % stenosis  The lesion was complex and eccentric  There was RAMBO grade 2 flow through the vessel (partial perfusion)   This is a likely culprit for the patient's clinical presentation  An intervention was performed  --  2nd obtuse marginal: The vessel was small sized  The artery was supplied by collaterals  The distal vessel was supplied by limited collaterals from the first obtuse marginal  --  RCA: The vessel was medium sized  Angiography showed mild atherosclerosis  --  Proximal RCA: There was a tubular 50 % stenosis  --  Distal RCA: There was a 30 % stenosis  --  Right PDA: There was a 50 % stenosis  IMPRESSIONS: There is significant triple vessel coronary artery disease  RECOMMENDATIONS Patient management should include increasing lipid lowering therapy  Patient management to include dual antiplatelet therapy  DISPOSITION: The patient left the catheterization laboratory in stable condition  Prepared and signed by Igor Faye MD Signed 2021 17:19:12 Study diagram Angiographic findings Native coronary lesions: ï¾·Proximal LAD: Lesion 1: 50 % stenosis  ï¾·D1: Lesion 1: tubular, 50 % stenosis  ï¾·Mid circumflex: Lesion 1: 100 % stenosis  ï¾·OM1: Lesion 1: discrete, 99 % stenosis  ï¾·Proximal RCA: Lesion 1: tubular, 50 % stenosis  ï¾·Distal RCA: Lesion 1: 30 % stenosis  ï¾·RPDA: Lesion 1: 50 % stenosis  Intervention results Native coronary lesions: ï¾·Successful balloon angioplasty and stent of the 99 % stenosis in OM1  Appearance excellent with 0 % residual stenosis  Stent: Resolute Prashanth Rx 3 5 x 12mm drug-eluting  Stent: Resolute Prashanth Rx 3 5 x 08mm drug-eluting   Hemodynamic tables Pressures:  Baseline Pressures:  - HR: 87 Pressures:  - Rhythm: Pressures:  -- Aortic Pressure (S/D/M): 121/67/96 Pressures:  -- Left Ventricle (s/edp): 122/15/-- Outputs:  Baseline Outputs:  -- CALCULATIONS: [age] Outputs:  -- CALCULATIONS: Body Surface Area: 2 12 Outputs:  -- CALCULATIONS: Height in cm: 185 00 Outputs:  -- CALCULATIONS: Sex: Male Outputs:  -- CALCULATIONS: Weight in k 90     CTA ED chest PE Study    Result Date: 2021  Narrative: CTA - CHEST WITH IV CONTRAST - PULMONARY ANGIOGRAM INDICATION:   cardiac arrest, new afib  "[facility]  4 rounds of cpr and one shock from AED  Pt is a/o x4  Pt is c/o chest pain and feels diaphoretic" COMPARISON: None  TECHNIQUE: CTA examination of the chest was performed using angiographic technique according to a protocol specifically tailored to evaluate for pulmonary embolism  Axial, sagittal, and coronal 2D reformatted images were created from the source data and  submitted for interpretation  In addition, coronal 3D MIP postprocessing was performed on the acquisition scanner  Radiation dose length product (DLP) for this visit:  622 mGy-cm   This examination, like all CT scans performed in the [facility], was performed utilizing techniques to minimize radiation dose exposure, including the use of iterative reconstruction and automated exposure control  IV Contrast:  85 mL of iohexol (OMNIPAQUE)  FINDINGS: PULMONARY ARTERIAL TREE:  No pulmonary embolus is seen  LUNGS:  Hypoventilatory changes in the dependent portions of the lung bases without other acute findings  No endotracheal or endobronchial lesion  PLEURA:  Unremarkable  HEART/GREAT VESSELS:  Coronary artery calcifications  Cardiomegaly  No thoracic aortic aneurysm  MEDIASTINUM AND WILIAM:  Unremarkable  CHEST WALL AND LOWER NECK:   Unremarkable  VISUALIZED STRUCTURES IN THE UPPER ABDOMEN:  Heterogeneous and fatty infiltration of the partially imaged liver  OSSEOUS STRUCTURES:  No acute fracture or destructive osseous lesion  Impression: No pulmonary arterial embolism  No pulmonary infiltrate/consolidation or pulmonary contusion  Workstation performed: [id number]       EKG personally reviewed by Wally Lynne MD      Counseling / Coordination of Care  Total floor / unit time spent today 30 minutes  Greater than 50% of total time was spent with the patient and / or family counseling and / or coordination of care

## 2021-08-02 NOTE — PROGRESS NOTES
Cardiology Team 2 Progress Note - Jack Gaspar 77 y o  male MRN: [id number]    Unit/Bed#: [facility] 523-01 Encounter: [id number]          Subjective:   Patient seen and examined  No significant events overnight  Denies any new active complaints this        Hospital Course: Jack Gaspar is a 77y o  year old male with a history of HTN, HLD, DM2 (A1c 7 8%) who presents to the ED after a Cardiac Arrest  Patient states that he has been in his usual state of health, is extremely active, goes on long hikes, works out 3-4x a week (vigorously) and otherwise has no physical limitations  Patient states that he was at the gym today, had about a 1 5 hour work out where he was pushing himself, towards the end of his work out he was doing the row machine and after 20 minutes he suddenly felt lightheaded and dizzy, he subsequently passed out before bystanders arrived  He denies any CP or palpitations prior to the incident  Bystanders felt he had no pulse and began CPR, he underwent 4 rounds and an AED was placed which advised shock  It is unclear what rhythm was seen during this time  Patient was shocked x1 before he achieved ROSC       Patient arrived to the ED and EKG was notable for Atrial Fibrillation (patient denies a history of this prior to present episode)  Patient was hemodynamically stable with an initial BP of 129/66, HR in 90s, Lactic Acid 5 1 with a slight ROSIBEL of 1 5  Initial troponin negative  Potassium 3 3      Upon further history, patient denies any family history of SCD or channelopathys that he is aware of  No CAD  His mother and sister both have atrial fibrillation  Mother passed at age 80      Patient denies the use of tobacco (stopped smoking >40 years ago)  Denies alcohol or drug use  No herbal remedies or supplements      States he has not noticed any viral symptoms to his knowledge  Got the final dose of the COVID19 vaccine in February 2021      Vitals: Blood pressure 130/63, pulse 105, temperature 98 2 °F (36 8 °C), resp  rate 22, weight 89 4 kg (197 lb 1 5 oz), SpO2 94 %  , Body mass index is 26 kg/m² ,   Orthostatic Blood Pressures      Most Recent Value   Blood Pressure  130/63 filed at 08/02/2021 8904   Patient Position - Orthostatic VS  Lying filed at 08/02/2021 8421            Intake/Output Summary (Last 24 hours) at 8/2/2021 5442  Last data filed at 8/2/2021 0813  Gross per 24 hour   Intake 340 ml   Output 1600 ml   Net -1260 ml         Physical Exam:    GEN: Gato Patino appears well, alert and oriented x 3, pleasant and cooperative   HEENT:  Normocephalic, atraumatic, anicteric, moist mucous membranes  NECK: No JVD or carotid bruits   HEART: reg rhythm, reg rate, normal S1 and S2, no murmurs, clicks, gallops or rubs   LUNGS: Clear to auscultation bilaterally; no wheezes, rales, or rhonchi; respiration nonlabored   ABDOMEN:  Normoactive bowel sounds, soft, no tenderness, no distention  EXTREMITIES: peripheral pulses palpable; no edema  NEURO: no gross focal findings; cranial nerves grossly intact   SKIN:  Dry, intact, warm to touch      Current Facility-Administered Medications:     aspirin chewable tablet 81 mg, 81 mg, Oral, Daily, Stephenei Haney MD, 81 mg at 08/02/21 0810    atorvastatin (LIPITOR) tablet 40 mg, 40 mg, Oral, Daily With Dinner, Stephenie Haney MD, 40 mg at 08/01/21 1638    clopidogrel (PLAVIX) tablet 75 mg, 75 mg, Oral, Daily, Stephenie Haney MD, 75 mg at 08/02/21 0809    DULoxetine (CYMBALTA) delayed release capsule 60 mg, 60 mg, Oral, Daily, Stephenie Haney MD, 60 mg at 08/02/21 0810    heparin (porcine) 25,000 units in 0 45% NaCl 250 mL infusion (premix), 3-20 Units/kg/hr (Order-Specific), Intravenous, Titrated, Tamiko Matias DO, Last Rate: 7 3 mL/hr at 08/02/21 0813, 8 1 Units/kg/hr at 08/02/21 0813    heparin (porcine) injection 2,000 Units, 2,000 Units, Intravenous, Q1H PRN, Tamiko Matias DO, 2,000 Units at 08/01/21 1730    heparin (porcine) injection 4,000 Units, 4,000 Units, Intravenous, Q1H PRN, Pollo Odonnell DO    insulin glargine (LANTUS) subcutaneous injection 10 Units 0 1 mL, 10 Units, Subcutaneous, HS, Pollo Odonnell DO, 10 Units at 08/01/21 2227    insulin lispro (HumaLOG) 100 units/mL subcutaneous injection 1-5 Units, 1-5 Units, Subcutaneous, TID AC, 1 Units at 08/02/21 0808 **AND** Fingerstick Glucose (POCT), , , TID AC, Pollo Odonnell DO    insulin lispro (HumaLOG) 100 units/mL subcutaneous injection 1-5 Units, 1-5 Units, Subcutaneous, HS, Pollo Odonnell DO, 1 Units at 08/01/21 2229    lidocaine (LIDODERM) 5 % patch 1 patch, 1 patch, Topical, Daily, Archana Villalpando MD, 1 patch at 08/02/21 0811    loratadine (CLARITIN) tablet 10 mg, 10 mg, Oral, Daily, Archana Villalpando MD, 10 mg at 08/02/21 0810    losartan (COZAAR) tablet 25 mg, 25 mg, Oral, Daily, Camelia Camacho MD, 25 mg at 08/02/21 0810    melatonin tablet 6 mg, 6 mg, Oral, HS, Archana Villalpando MD, 6 mg at 08/01/21 2226    metoprolol tartrate (LOPRESSOR) tablet 50 mg, 50 mg, Oral, Q12H [facility] & halfway, Camelia Camacho MD, 50 mg at 08/02/21 0810    pantoprazole (PROTONIX) EC tablet 40 mg, 40 mg, Oral, Early Morning, Archana Villalpando MD, 40 mg at 08/02/21 1203    vancomycin (VANCOCIN) IVPB (premix in dextrose) 1,000 mg 200 mL, 1,000 mg, Intravenous, Once, Archana Villalpando MD    Labs & Results:  Results from last 7 days   Lab Units 07/30/21  0046 07/29/21  2123 07/29/21  1829   TROPONIN I ng/mL 0 09* 0 09* 0 05*         Results from last 7 days   Lab Units 08/02/21  0609 07/31/21  0455 07/31/21  0339   POTASSIUM mmol/L 3 8 3 6 3 5   CO2 mmol/L 24 28 26   CHLORIDE mmol/L 103 104 103   BUN mg/dL 19 19 19   CREATININE mg/dL 1 27 1 22 1 16     Results from last 7 days   Lab Units 08/02/21  0609 07/31/21  0455 07/31/21  0313   HEMOGLOBIN g/dL 13 5 13 0 12 6   HEMATOCRIT % 41 3 41 3 38 6   PLATELETS Thousands/uL 220 209 216     Results from last 7 days   Lab Units 07/29/21  1513   TRIGLYCERIDES mg/dL 410*   HDL mg/dL 36*   HEMOGLOBIN A1C % 7 6* Telemetry:   Personally reviewed by Jessenia Foster MD:       VTE Prophylaxis: Heparin        Assessment:  Principal Problem:    Cardiac arrest [facility])  Active Problems:    Hypertension    Hyperlipidemia    DM2 (diabetes mellitus, type 2) (HCC)    Atrial fibrillation (HCC)    CAD (coronary artery disease)    Atrial mass      1  Cardiac Arrest In setting of Obstructive CAD s/p PCI to mLCx  And OM1  2  Paroxsymal  Atrial Fibrillation, New Onset  --> [id number]: 3  3  HTN // HLD  4  DM2  5  ? Atrial Septal mobile mass     Plan:  - c/w Aspirin 81mg PO Daily  - c/w Lipitor 40mg PO QHS  - c/w Metoprolol Tart  50mg PO BID  - c/w Losartan 25mg PO Daily  - Heparin ggt for AF with aPTT Q6H  --> Eventual transition to DOAC upon discharge  - EP following, will obtain out patient monitor and hold off on ICD for now  - f/u EBONY for ?atrial mass  - Continue with Telemetry    Case discussed and reviewed with Dr David Banks who agrees with my assessment and plan  Thank you for involving us in the care of your patient  Jessenia Foster MD  Cardiology Fellow PGY-4      Epic/ Allscripts/Care Everywhere records reviewed:     ** Please Note: Fluency DirectDictation voice to text software may have been used in the creation of this document   **

## 2021-08-02 NOTE — PROGRESS NOTES
[address] [facility]  Progress Note - Kenn Homans 1955, 77 y o  male MRN: [id number]  Unit/Bed#: [facility] 523-01 Encounter: [id number]  Primary Care Provider: Nilda Rosales MD   Date and time admitted to hospital: 7/29/2021  3:04 PM    * Cardiac arrest [facility])  Assessment & Plan  -47-year-old male status post cardiac arrest at a fitness center with return of circulation by bystanders and AED use  No PE on CT scan  Status post cardiac catheterization on 07/30 There is significant triple vessel coronary artery disease, status post DESx 2 and angioplasty  Currently on aspirin, Plavix, statin and beta-blocker  Cardiac echo with EF 60%  Cardiology and EP are following for possible dual-chamber defibrillator placement  Monitor on tele    CAD (coronary artery disease)  Assessment & Plan  Status post cardiac catheterization on 07/30 There is significant triple vessel coronary artery disease, status post DESx 2 to the obtus marginal branch  and angioplasty  Currently on aspirin, Plavix, statin and beta-blocker    Atrial fibrillation ([address] )  Assessment & Plan  · New onset atrial fibrillation  · Unclear onset as pt arrived to the hospital in atrial fibrillation  ? Cardiology is following  ? On heparin drip    Atrial mass  Assessment & Plan  There was a possible, medium-sized, spherical, echogenic, mobile mass on the right side of the interatrial septum  Cardiology is following  Plan for EBONY  today    DM2 (diabetes mellitus, type 2) [facility])  Assessment & Plan  Lab Results   Component Value Date    HGBA1C 7 6 (H) 07/29/2021       Recent Labs     08/01/21  1111 08/01/21  1615 08/01/21  2105 08/02/21  0604   POCGLU 225* 224* 220* 200*       Blood Sugar Average: Last 72 hrs:  (P) 215 75   Hold oral meds  Continue Lantus q h s    Continue insulin sliding scale    Hypertension  Assessment & Plan  Acceptable BP  Continue Lopressor and Cozaar      ARF POA  Treated and resolved    VTE Pharmacologic Prophylaxis:   heparin drip    Patient Centered Rounds: I performed bedside rounds with nursing staff today  Discussions with Specialists or Other Care Team Provider:     Education and Discussions with Family / Patient: Updated  (wife) at bedside  Time Spent for Care: 45 minutes  More than 50% of total time spent on counseling and coordination of care as described above  Current Length of Stay: 4 day(s)  Current Patient Status: Inpatient   Certification Statement: The patient will continue to require additional inpatient hospital stay due to Above  Discharge Plan: TBD    Code Status: Level 1 - Full Code    Subjective:   Patient seen and examined  Comfortable in bed  No event overnight  Denied complaint  NPO    Objective:     Vitals:   Temp (24hrs), Av 5 °F (36 9 °C), Min:98 2 °F (36 8 °C), Max:98 8 °F (37 1 °C)    Temp:  [98 2 °F (36 8 °C)-98 8 °F (37 1 °C)] 98 4 °F (36 9 °C)  HR:  [] 76  Resp:  [16-22] 18  BP: (105-133)/(57-80) 105/57  SpO2:  [90 %-97 %] 92 %  Body mass index is 26 kg/m²  Input and Output Summary (last 24 hours):      Intake/Output Summary (Last 24 hours) at 2021 1142  Last data filed at 2021 0813  Gross per 24 hour   Intake 160 ml   Output 1600 ml   Net -1440 ml       Physical Exam:   Physical Exam     Patient is awake alert oriented no acute distress  Lung clear to auscultation bilateral  Heart positive S1-S2 no murmur  Abdomen soft nontender  Lower extremities no edema  Additional Data:     Labs:  Results from last 7 days   Lab Units 21  0609   WBC Thousand/uL 9 25   HEMOGLOBIN g/dL 13 5   HEMATOCRIT % 41 3   PLATELETS Thousands/uL 220   NEUTROS PCT % 48   LYMPHS PCT % 39   MONOS PCT % 9   EOS PCT % 3     Results from last 7 days   Lab Units 21  0609 21  1952   SODIUM mmol/L 133* 139   POTASSIUM mmol/L 3 8 4 0   CHLORIDE mmol/L 103 105   CO2 mmol/L 24 25   BUN mg/dL 19 22   CREATININE mg/dL 1 27 1 27   ANION GAP mmol/L 6 9   CALCIUM mg/dL 9 1 9 0   ALBUMIN g/dL  --  3 6   TOTAL BILIRUBIN mg/dL  --  0 47   ALK PHOS U/L  --  70   ALT U/L  --  77   AST U/L  --  55*   GLUCOSE RANDOM mg/dL 199* 198*     Results from last 7 days   Lab Units 07/31/21  1108   INR  0 95     Results from last 7 days   Lab Units 08/02/21  0604 08/01/21  2105 08/01/21  1615 08/01/21  1111 08/01/21  0628 07/31/21  2100 07/31/21  1727 07/31/21  1224   POC GLUCOSE mg/dl 200* 220* 224* 225* 197* 232* 198* 230*     Results from last 7 days   Lab Units 07/29/21  1513   HEMOGLOBIN A1C % 7 6*     Results from last 7 days   Lab Units 07/30/21  0524 07/30/21  0046 07/29/21  1952 07/29/21  1724   LACTIC ACID mmol/L 1 4 1 6 2 9* 3 4*       Lines/Drains:  Invasive Devices     Peripheral Intravenous Line            Peripheral IV 07/30/21 Distal;Left;Ventral (anterior) Forearm 3 days                  Telemetry:  Telemetry Orders (From admission, onward)             48 Hour Telemetry Monitoring  Continuous x 48 hours     Question:  Reason for 48 Hour Telemetry  Answer:  Arrhythmias Requiring Medical Therapy (eg  SVT, Vtach/fib, Bradycardia, Uncontrolled A-fib)                 Telemetry Reviewed:yes  Indication for Continued Telemetry Use: Arrthymias requiring medical therapy           Imaging: No pertinent imaging reviewed      Recent Cultures (last 7 days):         Last 24 Hours Medication List:   Current Facility-Administered Medications   Medication Dose Route Frequency Provider Last Rate    aspirin  81 mg Oral Daily Fely Palma MD      atorvastatin  40 mg Oral Daily With Troy Solomon MD      clopidogrel  75 mg Oral Daily Fely Palma MD      DULoxetine  60 mg Oral Daily Fely Palma MD      heparin (porcine)  3-20 Units/kg/hr (Order-Specific) Intravenous Titrated Xi De Pere, DO 8 1 Units/kg/hr (08/02/21 0813)    heparin (porcine)  2,000 Units Intravenous Q1H PRN Xi De Pere, DO      heparin (porcine)  4,000 Units Intravenous Q1H PRN Xi De Pere, DO      insulin glargine  10 Units Subcutaneous HS Jenny Ramsey DO      insulin lispro  1-5 Units Subcutaneous TID [facility] Jenny Ramsey DO      insulin lispro  1-5 Units Subcutaneous HS Jenny Ramsey DO      lidocaine  1 patch Topical Daily Lukasz Cardenas MD      loratadine  10 mg Oral Daily Lukasz Cardenas MD      losartan  25 mg Oral Daily Wally Lynne MD      melatonin  6 mg Oral HS Lukasz Cardenas MD      metoprolol tartrate  50 mg Oral Q12H [address] Wally Lynne MD      pantoprazole  40 mg Oral Early Morning Lukasz Cardenas MD      vancomycin  1,000 mg Intravenous Once Lukasz Cardenas MD          Today, Patient Was Seen By: Jenny Ramsey DO    **Please Note: This note may have been constructed using a voice recognition system  **

## 2021-08-02 NOTE — ASSESSMENT & PLAN NOTE
-56-year-old male status post cardiac arrest at a fitness center with return of circulation by bystanders and AED use  No PE on CT scan  Status post cardiac catheterization on 07/30 There is significant triple vessel coronary artery disease, status post DESx 2 and angioplasty  Currently on aspirin, Plavix, statin and beta-blocker  Cardiac echo with EF 60%  Cardiology and EP are following for possible dual-chamber defibrillator placement  Monitor on tele

## 2021-08-02 NOTE — PROGRESS NOTES
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 which may contribute to chronic scar formation and VT re-entry  As such, he is as elevated risk to have a recurrent episode  However, the patient does not meet criteria for ICD implantation at this time as he is within 90 days of PCI, demonstrates normal EF without RV involvement, does not require pacing, and has not had any recurrent episodes on telemetry following revascularization  Plan: We will order a 1 month outpatient cardiac monitor to assess for recurrence and hold off on ICD implantation for now  Patient to obtain EBONY today to evaluate a possible RA thrombus/mass  As atrial fibrillation may recur, would continue therapeutic anticoagulation as outpatient given the patient's increased stroke risk (i e  Rivaroxaban 15mg Daily), with transition from dual-antiplatelet therapy + oral anticoagulation to Clopidogrel + [address] after 3-6 months  Subjective/Objective   Chief Complaint: Cardiac arrest    Subjective: Patient was seen and examined at bedside and has no complaints       Objective:    Vitals: /57   Pulse 76   Temp 98 4 °F (36 9 °C)   Resp 18   Wt 89 4 kg (197 lb 1 5 oz)   SpO2 92%   BMI 26 00 kg/m²   Vitals:    08/01/21 0500 08/02/21 0613   Weight: 88 5 kg (195 lb 1 7 oz) 89 4 kg (197 lb 1 5 oz)     Orthostatic Blood Pressures      Most Recent Value   Blood Pressure  105/57 filed at 08/02/2021 1123   Patient Position - Orthostatic VS  Lying filed at 08/02/2021 0453        GEN: Donel Speed appears well, alert and oriented x 3, pleasant and cooperative   NECK: supple, no carotid bruits, no JVD or HJR  HEART: normal rate, regular rhythm, normal S1 and S2, no murmurs, clicks, gallops or rubs   LUNGS: clear to auscultation bilaterally; no wheezes, rales, or rhonchi   ABDOMEN: normal bowel sounds, soft, no tenderness, no distention  EXTREMITIES: peripheral pulses normal; no clubbing, cyanosis, or edema  SKIN: warm and well perfused, no suspicious lesions on exposed skin       Intake/Output Summary (Last 24 hours) at 8/2/2021 1134  Last data filed at 8/2/2021 0813  Gross per 24 hour   Intake 160 ml   Output 1600 ml   Net -1440 ml       Invasive Devices     Peripheral Intravenous Line            Peripheral IV 07/30/21 Distal;Left;Ventral (anterior) Forearm 3 days

## 2021-08-02 NOTE — ASSESSMENT & PLAN NOTE
There was a possible, medium-sized, spherical, echogenic, mobile mass on the right side of the interatrial septum  Cardiology is following  Plan for EBONY  today

## 2021-08-02 NOTE — ASSESSMENT & PLAN NOTE
Status post cardiac catheterization on 07/30 There is significant triple vessel coronary artery disease, status post DESx 2 to the obtus marginal branch  and angioplasty  Currently on aspirin, Plavix, statin and beta-blocker

## 2021-08-03 ENCOUNTER — CLINICAL SUPPORT (OUTPATIENT)
Dept: CARDIOLOGY CLINIC | Facility: CLINIC | Age: 66
End: 2021-08-03
Payer: MEDICARE

## 2021-08-03 VITALS
HEART RATE: 69 BPM | BODY MASS INDEX: 25.33 KG/M2 | RESPIRATION RATE: 18 BRPM | TEMPERATURE: 98.2 F | WEIGHT: 192.02 LBS | DIASTOLIC BLOOD PRESSURE: 65 MMHG | OXYGEN SATURATION: 94 % | SYSTOLIC BLOOD PRESSURE: 125 MMHG

## 2021-08-03 DIAGNOSIS — I47.2 VT (VENTRICULAR TACHYCARDIA) (HCC): Primary | ICD-10-CM

## 2021-08-03 LAB
APTT PPP: 77 SECONDS (ref 23–37)
GLUCOSE SERPL-MCNC: 207 MG/DL (ref 65–140)
GLUCOSE SERPL-MCNC: 262 MG/DL (ref 65–140)

## 2021-08-03 PROCEDURE — 99239 HOSP IP/OBS DSCHRG MGMT >30: CPT | Performed by: FAMILY MEDICINE

## 2021-08-03 PROCEDURE — 82948 REAGENT STRIP/BLOOD GLUCOSE: CPT

## 2021-08-03 PROCEDURE — 85730 THROMBOPLASTIN TIME PARTIAL: CPT | Performed by: HOSPITALIST

## 2021-08-03 PROCEDURE — 99231 SBSQ HOSP IP/OBS SF/LOW 25: CPT | Performed by: INTERNAL MEDICINE

## 2021-08-03 PROCEDURE — 99211 OFF/OP EST MAY X REQ PHY/QHP: CPT

## 2021-08-03 RX ORDER — METOPROLOL TARTRATE 50 MG/1
50 TABLET, FILM COATED ORAL EVERY 12 HOURS SCHEDULED
Qty: 60 TABLET | Refills: 0 | Status: SHIPPED | OUTPATIENT
Start: 2021-08-03 | End: 2021-08-24 | Stop reason: ALTCHOICE

## 2021-08-03 RX ORDER — LOSARTAN POTASSIUM 25 MG/1
25 TABLET ORAL DAILY
Qty: 30 TABLET | Refills: 0 | Status: SHIPPED | OUTPATIENT
Start: 2021-08-04 | End: 2021-08-24 | Stop reason: SDUPTHER

## 2021-08-03 RX ORDER — PANTOPRAZOLE SODIUM 40 MG/1
40 TABLET, DELAYED RELEASE ORAL
Qty: 30 TABLET | Refills: 0 | Status: SHIPPED | OUTPATIENT
Start: 2021-08-04

## 2021-08-03 RX ORDER — CLOPIDOGREL BISULFATE 75 MG/1
75 TABLET ORAL DAILY
Qty: 30 TABLET | Refills: 0 | Status: SHIPPED | OUTPATIENT
Start: 2021-08-04 | End: 2021-08-24 | Stop reason: SDUPTHER

## 2021-08-03 RX ADMIN — LOSARTAN POTASSIUM 25 MG: 25 TABLET, FILM COATED ORAL at 08:56

## 2021-08-03 RX ADMIN — METOPROLOL TARTRATE 50 MG: 50 TABLET, FILM COATED ORAL at 08:57

## 2021-08-03 RX ADMIN — ASPIRIN 81 MG CHEWABLE TABLET 81 MG: 81 TABLET CHEWABLE at 08:56

## 2021-08-03 RX ADMIN — LIDOCAINE 1 PATCH: 50 PATCH TOPICAL at 08:55

## 2021-08-03 RX ADMIN — INSULIN LISPRO 2 UNITS: 100 INJECTION, SOLUTION INTRAVENOUS; SUBCUTANEOUS at 12:10

## 2021-08-03 RX ADMIN — INSULIN LISPRO 1 UNITS: 100 INJECTION, SOLUTION INTRAVENOUS; SUBCUTANEOUS at 08:50

## 2021-08-03 RX ADMIN — DULOXETINE HYDROCHLORIDE 60 MG: 60 CAPSULE, DELAYED RELEASE ORAL at 08:55

## 2021-08-03 RX ADMIN — LORATADINE 10 MG: 10 TABLET ORAL at 08:56

## 2021-08-03 RX ADMIN — PANTOPRAZOLE SODIUM 40 MG: 40 TABLET, DELAYED RELEASE ORAL at 05:34

## 2021-08-03 RX ADMIN — CLOPIDOGREL BISULFATE 75 MG: 75 TABLET ORAL at 08:56

## 2021-08-03 NOTE — DISCHARGE SUMMARY
[address] [facility]  Discharge- Seda Endow 1955, 77 y o  male MRN: [id number]  Unit/Bed#: [facility] 523-01 Encounter: [id number]  Primary Care Provider: Curtis Chen MD   Date and time admitted to hospital: 7/29/2021  3:04 PM    * Cardiac arrest [facility])  Assessment & Plan  · 25-year-old male status post cardiac arrest at a fitness center with return of circulation by bystanders and AED use  · No PE on CT scan  · Status post cardiac catheterization on 07/30 There is significant triple vessel coronary artery disease, status post DESx 2 and angioplasty  · Currently on aspirin, Plavix, statin and beta-blocker  · Cardiac echo with EF 60%  · Discussed with electrophysiology: Will place live Zio monitor that was recommended to be placed just after discharge at [facility] office today    Atrial mass  Assessment & Plan  There was a possible, medium-sized, spherical, echogenic, mobile mass on the right side of the interatrial septum  Cardiology is following  Luis Alberto Clemons was obtained and it did not reveal any evidence of mass    CAD (coronary artery disease)  Assessment & Plan  Status post cardiac catheterization on 07/30 There is significant triple vessel coronary artery disease, status post DESx 2 to the obtus marginal branch  and angioplasty  Currently on aspirin, Plavix, statin and beta-blocker    Atrial fibrillation ([facility] [address] )  Assessment & Plan  · New onset atrial fibrillation  · Unclear onset as pt arrived to the hospital in atrial fibrillation  · Initially was treated with heparin drip, no further AFib episodes were noted    · Cardiology does not recommend any anticoagulation on discharge    DM2 (diabetes mellitus, type 2) [facility])  Assessment & Plan  Lab Results   Component Value Date    HGBA1C 7 6 (H) 07/29/2021       Recent Labs     08/02/21  1601 08/02/21  2116 08/03/21  0626 08/03/21  1055   POCGLU 187* 195* 207* 262*       Blood Sugar Average: Last 72 hrs:  (P) 210 1   Resume oral meds on discharge    Hypertension  Assessment & Plan  Acceptable BP  Continue Lopressor and Cozaar         Discharging Physician / Practitioner: Fidelina Acosta MD  PCP: Saud Gonzalez MD  Admission Date:   Admission Orders (From admission, onward)     Ordered        07/29/21 1629  Inpatient Admission  Once                   Discharge Date: 08/03/21    Medical Problems     Resolved Problems  Date Reviewed: 8/3/2021    None                Consultations During Hospital Stay:  · Cardiology  · Electrophysiology  · Critical care    Procedures Performed:   · Cardiac coronary angio/PCI 07/30/2021  · Simone 08/02/2021    Significant Findings / Test Results:   Cardiac catheterization:    CORONARY VESSELS:   --  The coronary circulation is right dominant  --  Left main: 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  --  LAD: The vessel was medium sized  Angiography showed moderate atherosclerosis  --  Proximal LAD: There was a 50 % stenosis at the origin of D1   --  1st diagonal: The vessel was small sized  There was a tubular 50 % stenosis  --  Circumflex: The vessel was large sized  --  Mid circumflex: There was a 100 % stenosis  This lesion is a chronic total occlusion  --  1st obtuse marginal: There was a discrete 99 % stenosis  The lesion was complex and eccentric  There was RAMBO grade 2 flow through the vessel (partial perfusion)  This is a likely culprit for the patient's clinical presentation  An  intervention was performed  --  2nd obtuse marginal: The vessel was small sized  The artery was supplied by collaterals  The distal vessel was supplied by limited collaterals from the first obtuse marginal   --  RCA: The vessel was medium sized  Angiography showed mild atherosclerosis  --  Proximal RCA: There was a tubular 50 % stenosis  --  Distal RCA: There was a 30 % stenosis  · --  Right PDA: There was a 50 % stenosis        Transesophageal echocardiogram 08/02/2021: SUMMARY     LEFT VENTRICLE:  Systolic function was at the lower limits of normal  Ejection fraction was estimated to be 50 %  There was hypokinesis of the basal inferior and basal-mid inferolateral wall(s)  There was no evidence of concentric hypertrophy      LEFT ATRIAL APPENDAGE:  The size was normal   The function was normal (normal emptying velocity)  No thrombus was identified      ATRIAL SEPTUM:  No defect or patent foramen ovale was identified      RIGHT ATRIUM:  Size was normal   There was no evidence of a mass  No thrombus was identified      MITRAL VALVE:  There was trace regurgitation      TRICUSPID VALVE:  There was trace regurgitation  Incidental Findings:   · None     Test Results Pending at Discharge (will require follow up): · Not     Outpatient Tests Requested:  · Follow-up with EP for Zio patch    Complications:  none    Reason for Admission: cardiac arrest    Hospital Course: Naomi Tracy is a 77 y o  male patient who originally presented to the hospital on 7/29/2021 due to cardiac arrest at fitness center, ROSC close achieved by CPR and AED use  In ER, EKG reveals a fibrillation, and elevated troponin  Upon arrival to ER, CTA PE was negative for pulmonary embolism  Patient mildly elevated troponin  Cardiology consulted  Patient was hemodynamically stable  Patient underwent cardiac catheterization on 07/30/2021 which showed significant triple-vessel coronary disease status post IAM x2 and angioplasty  Electrophysiology has been consulted, for possible ICD placement however decided not to please ICD  Patient was started on metoprolol and losartan  Patient was placed on aspirin and Plavix and Lipitor as well  Patient recovered significantly well  No further AFib was seen therefore decision was made not to continue anticoagulation and discharged  Patient completely returned back to his baseline    The patient is clear for discharge with outpatient live [facility] patch placement shortly after discharge  Please see above list of diagnoses and related plan for additional information  Condition at Discharge: good     Discharge Day Visit / Exam:     Subjective:  Patient is sitting up on the chair eating lunch  Patient is not in distress  Denies any chest pain or dyspnea  Denies any complaint  Patient is very pleasant  Vitals: Blood Pressure: 125/65 (08/03/21 1056)  Pulse: 69 (08/03/21 1056)  Temperature: 98 2 °F (36 8 °C) (08/03/21 1056)  Temp Source: Oral (08/03/21 1056)  Respirations: 18 (08/03/21 1056)  Weight - Scale: 87 1 kg (192 lb 0 3 oz) (08/03/21 0539)  SpO2: 94 % (08/03/21 1056)  Exam:   Physical Exam  Vitals and nursing note reviewed  Constitutional:       General: He is not in acute distress  Appearance: Normal appearance  HENT:      Head: Normocephalic and atraumatic  Right Ear: External ear normal       Left Ear: External ear normal       Nose: Nose normal    Eyes:      Extraocular Movements: Extraocular movements intact  Pulmonary:      Effort: Pulmonary effort is normal    Musculoskeletal:      Cervical back: Normal range of motion  Neurological:      Mental Status: He is alert  Mental status is at baseline  Psychiatric:         Mood and Affect: Mood normal           Discussion with Family:  Wife at bedside    Discharge instructions/Information to patient and family:   See after visit summary for information provided to patient and family  Provisions for Follow-Up Care:  See after visit summary for information related to follow-up care and any pertinent home health orders  Disposition:     Home       Planned Readmission: no     Discharge Statement:  I spent 45 minutes discharging the patient  This time was spent on the day of discharge  I had direct contact with the patient on the day of discharge   Greater than 50% of the total time was spent examining patient, answering all patient questions, arranging and discussing plan of care with patient as well as directly providing post-discharge instructions  Additional time then spent on discharge activities  Discharge Medications:  See after visit summary for reconciled discharge medications provided to patient and family        ** Please Note: This note has been constructed using a voice recognition system **

## 2021-08-03 NOTE — PROGRESS NOTES
Cardiology Team 2 Progress Note - Tenisha Kam 77 y o  male MRN: [id number]    Unit/Bed#: [facility] 523-01 Encounter: [id number]          Subjective:   Patient seen and examined  No significant events overnight  Hospital Course: Tenisha Kam is a 77y o  year old male with a history of HTN, HLD, DM2 (A1c 7 8%) who presents to the ED after a Cardiac Arrest  Patient states that he has been in his usual state of health, is extremely active, goes on long hikes, works out 3-4x a week (vigorously) and otherwise has no physical limitations  Patient states that he was at the gym today, had about a 1 5 hour work out where he was pushing himself, towards the end of his work out he was doing the row machine and after 20 minutes he suddenly felt lightheaded and dizzy, he subsequently passed out before bystanders arrived  He denies any CP or palpitations prior to the incident  Bystanders felt he had no pulse and began CPR, he underwent 4 rounds and an AED was placed which advised shock  It is unclear what rhythm was seen during this time  Patient was shocked x1 before he achieved ROSC       Patient arrived to the ED and EKG was notable for Atrial Fibrillation (patient denies a history of this prior to present episode)  Patient was hemodynamically stable with an initial BP of 129/66, HR in 90s, Lactic Acid 5 1 with a slight ROSIBEL of 1 5  Initial troponin negative  Potassium 3 3      Upon further history, patient denies any family history of SCD or channelopathys that he is aware of  No CAD  His mother and sister both have atrial fibrillation  Mother passed at age 80      Patient denies the use of tobacco (stopped smoking >40 years ago)  Denies alcohol or drug use  No herbal remedies or supplements      States he has not noticed any viral symptoms to his knowledge  Got the final dose of the COVID19 vaccine in February 2021      Vitals: Blood pressure 116/61, pulse 70, temperature 98 °F (36 7 °C), temperature source Oral, resp  rate 16, weight 87 1 kg (192 lb 0 3 oz), SpO2 94 %  , Body mass index is 25 33 kg/m² ,   Orthostatic Blood Pressures      Most Recent Value   Blood Pressure  116/61 filed at 08/03/2021 0722   Patient Position - Orthostatic VS  Lying filed at 08/03/2021 7133            Intake/Output Summary (Last 24 hours) at 8/3/2021 0851  Last data filed at 8/2/2021 1824  Gross per 24 hour   Intake 980 ml   Output 400 ml   Net 580 ml         Physical Exam:    GEN: Gato Patino appears well, alert and oriented x 3, pleasant and cooperative   HEENT:  Normocephalic, atraumatic, anicteric, moist mucous membranes  NECK: No JVD or carotid bruits   HEART: reg rhythm, reg rate, normal S1 and S2, no murmurs, clicks, gallops or rubs   LUNGS: Clear to auscultation bilaterally; no wheezes, rales, or rhonchi; respiration nonlabored   ABDOMEN:  Normoactive bowel sounds, soft, no tenderness, no distention  EXTREMITIES: peripheral pulses palpable; no edema  NEURO: no gross focal findings; cranial nerves grossly intact   SKIN:  Dry, intact, warm to touch      Current Facility-Administered Medications:     aspirin chewable tablet 81 mg, 81 mg, Oral, Daily, Stephenie Haney MD, 81 mg at 08/02/21 0810    atorvastatin (LIPITOR) tablet 40 mg, 40 mg, Oral, Daily With Dinner, Stephenie Haney MD, 40 mg at 08/02/21 1551    clopidogrel (PLAVIX) tablet 75 mg, 75 mg, Oral, Daily, Stephenie Haney MD, 75 mg at 08/02/21 0809    DULoxetine (CYMBALTA) delayed release capsule 60 mg, 60 mg, Oral, Daily, Stephenie Haney MD, 60 mg at 08/02/21 0810    heparin (porcine) 25,000 units in 0 45% NaCl 250 mL infusion (premix), 3-20 Units/kg/hr (Order-Specific), Intravenous, Titrated, Tamiko Matias DO, Last Rate: 10 9 mL/hr at 08/02/21 1729, 12 1 Units/kg/hr at 08/02/21 1729    heparin (porcine) injection 2,000 Units, 2,000 Units, Intravenous, Q1H PRN, Tamiko Matias DO, 2,000 Units at 08/01/21 1730    heparin (porcine) injection 4,000 Units, 4,000 Units, Intravenous, Q1H PRN, Pollo Odonnell DO, 4,000 Units at 08/02/21 1730    insulin glargine (LANTUS) subcutaneous injection 10 Units 0 1 mL, 10 Units, Subcutaneous, HS, Pollo Odonnell DO, 10 Units at 08/02/21 2209    insulin lispro (HumaLOG) 100 units/mL subcutaneous injection 1-5 Units, 1-5 Units, Subcutaneous, TID AC, 1 Units at 08/02/21 1717 **AND** Fingerstick Glucose (POCT), , , TID AC, Pollo Odonnell DO    insulin lispro (HumaLOG) 100 units/mL subcutaneous injection 1-5 Units, 1-5 Units, Subcutaneous, HS, Pollo Odonnell DO, 1 Units at 08/02/21 2209    lidocaine (LIDODERM) 5 % patch 1 patch, 1 patch, Topical, Daily, Archana Villalpando MD, 1 patch at 08/02/21 0811    loratadine (CLARITIN) tablet 10 mg, 10 mg, Oral, Daily, Archana Villalpando MD, 10 mg at 08/02/21 0810    losartan (COZAAR) tablet 25 mg, 25 mg, Oral, Daily, Camelia Camacho MD, 25 mg at 08/02/21 0810    melatonin tablet 6 mg, 6 mg, Oral, HS, Archana Villalpando MD, 6 mg at 08/02/21 2209    metoprolol tartrate (LOPRESSOR) tablet 50 mg, 50 mg, Oral, Q12H [facility], Camelia Camacho MD, 50 mg at 08/02/21 2209    pantoprazole (PROTONIX) EC tablet 40 mg, 40 mg, Oral, Early Morning, Archana Villalpando MD, 40 mg at 08/03/21 0534    vancomycin (VANCOCIN) IVPB (premix in dextrose) 1,000 mg 200 mL, 1,000 mg, Intravenous, Once, Archana Villalpando MD    Labs & Results:  Results from last 7 days   Lab Units 07/30/21  0046 07/29/21  2123 07/29/21  1829   TROPONIN I ng/mL 0 09* 0 09* 0 05*         Results from last 7 days   Lab Units 08/02/21  0609 07/31/21  0455 07/31/21  0339   POTASSIUM mmol/L 3 8 3 6 3 5   CO2 mmol/L 24 28 26   CHLORIDE mmol/L 103 104 103   BUN mg/dL 19 19 19   CREATININE mg/dL 1 27 1 22 1 16     Results from last 7 days   Lab Units 08/02/21  0609 07/31/21  0455 07/31/21  0313   HEMOGLOBIN g/dL 13 5 13 0 12 6   HEMATOCRIT % 41 3 41 3 38 6   PLATELETS Thousands/uL 220 209 216     Results from last 7 days   Lab Units 07/29/21  1513   TRIGLYCERIDES mg/dL 410*   HDL mg/dL 36* HEMOGLOBIN A1C % 7 6*       Telemetry:   Personally reviewed by Belle Billy MD:       VTE Prophylaxis: Heparin        Assessment:  Principal Problem:    Cardiac arrest [facility])  Active Problems:    Hypertension    Hyperlipidemia    DM2 (diabetes mellitus, type 2) (HCC)    Atrial fibrillation (HCC)    CAD (coronary artery disease)    Atrial mass      1  Cardiac Arrest In setting of Obstructive CAD s/p PCI to mLCx  And OM1  --> No Atrial septal mass identified on EBONY  2  Paroxsymal  Atrial Fibrillation, New Onset  --> [id number]: 3  3  HTN // HLD  4  DM2  5  ? Atrial Septal mobile mass     Plan:  - c/w Aspirin 81mg PO Daily  - c/w Plavix 75mg PO Daily  - c/w Lipitor 40mg PO QHS  - c/w Metoprolol Tart  50mg PO BID  - c/w Losartan 25mg PO Daily  - EP following, will obtain live out patient event monitor x4 weeks and hold off on ICD for now  - d/c AC, will evaluate arrythmia with live recorder being arranged by EP and if needing AC will send to pharmacy at that time  Patient has not had an AF episode on Tele since initial presentation   - Patient should avoid driving x1 month  - Discharge planning for today  - Patient will be called about follow up appointment by clinic staff, tentatively for 2-3 weeks post-dc    Case discussed and reviewed with Dr Reji Carrera who agrees with my assessment and plan  Thank you for involving us in the care of your patient  Belle Billy MD  Cardiology Fellow PGY-4      Epic/ Allscripts/Care Everywhere records reviewed:     ** Please Note: Fluency DirectDictation voice to text software may have been used in the creation of this document   **

## 2021-08-03 NOTE — QUICK NOTE
Patient is stable from an EP standpoint for discharge at this time  However, he does have further workup  Therefore, would request that the primary team reach back out the day before discharge so that we can arrange for the live Zio monitor that was recommended to be placed just after discharge at our [address] [place] [address] office  EP will sign off at this point but please reach out for any further questions or concerns

## 2021-08-04 NOTE — ASSESSMENT & PLAN NOTE
Lab Results   Component Value Date    HGBA1C 7 6 (H) 07/29/2021       Recent Labs     08/02/21  1601 08/02/21  2116 08/03/21  0626 08/03/21  1055   POCGLU 187* 195* 207* 262*       Blood Sugar Average: Last 72 hrs:  (P) 210 1   Resume oral meds on discharge

## 2021-08-04 NOTE — ASSESSMENT & PLAN NOTE
· 49-year-old male status post cardiac arrest at a fitness center with return of circulation by bystanders and AED use  · No PE on CT scan  · Status post cardiac catheterization on 07/30 There is significant triple vessel coronary artery disease, status post DESx 2 and angioplasty  · Currently on aspirin, Plavix, statin and beta-blocker  · Cardiac echo with EF 60%  · Discussed with electrophysiology:   Will place live Zio monitor that was recommended to be placed just after discharge at [facility] office today

## 2021-08-04 NOTE — ASSESSMENT & PLAN NOTE
There was a possible, medium-sized, spherical, echogenic, mobile mass on the right side of the interatrial septum  Cardiology is following  Wang Maia was obtained and it did not reveal any evidence of mass

## 2021-08-04 NOTE — ASSESSMENT & PLAN NOTE
· New onset atrial fibrillation  · Unclear onset as pt arrived to the hospital in atrial fibrillation  · Initially was treated with heparin drip, no further AFib episodes were noted    · Cardiology does not recommend any anticoagulation on discharge

## 2021-08-17 ENCOUNTER — CLINICAL SUPPORT (OUTPATIENT)
Dept: CARDIOLOGY CLINIC | Facility: CLINIC | Age: 66
End: 2021-08-17
Payer: MEDICARE

## 2021-08-17 DIAGNOSIS — I47.2 VT (VENTRICULAR TACHYCARDIA) (HCC): Primary | ICD-10-CM

## 2021-08-17 PROCEDURE — 99211 OFF/OP EST MAY X REQ PHY/QHP: CPT

## 2021-08-17 NOTE — PROGRESS NOTES
Tenisha Kam is here today under the direction of Nita Castellon  Patch applied and all instructions for use given to patient in office  This is the second patch of 2

## 2021-08-23 NOTE — PROGRESS NOTES
Outpatient Consultation - General Cardiology   Clement Jensen 77 y o  male   MRN: [id number]  Encounter: [id number]      PCP: She Adrian MD    History of Present Illness   Physician Requesting Consult: Consults   Reason for Consult / Principal Problem: Hospital follow up    HPI: Clement Jensen is a 77y o  year old male   with a history of HTN, HLD, DM2 (A1c 7 8%)  Patient was seen by me in the ED  Patient presents to Cardiology office for a post-discharge follow up visit  He presented to the ED after a Cardiac Arrest  Patient stated that he has been in his usual state of health, is extremely active, goes on long hikes, works out 3-4x a week (vigorously) and otherwise has no physical limitations  Patient states that he was at the gym today, had about a 1 5 hour work out where he was pushing himself, towards the end of his work out he was doing the row machine and after 20 minutes he suddenly felt lightheaded and dizzy, he subsequently passed out before bystanders arrived  He underwent 4 rounds of CPR and an AED was placed which advised shock x1  Patient arrived to the ED and EKG was notable for Atrial Fibrillation (patient denies a history of this prior to present episode)  Patient was hemodynamically stable with an initial BP of 129/66, HR in 90s, Lactic Acid 5 1 with a slight ROSIBEL of 1 5  Initial troponin negative  Potassium 3 3      Upon further history, patient denies any family history of SCD or channelopathys that he is aware of  No CAD  His mother and sister both have atrial fibrillation  Mother passed at age 80      Patient denies the use of tobacco (stopped smoking >40 years ago)  Denies alcohol or drug use  No herbal remedies or supplements      States he has not noticed any viral symptoms to his knowledge  Got the final dose of the COVID19 vaccine in February 2021 8/24/21: Since being discharged  Patient states he has returned to his normal state of health   Denies any active complaints at this time  Endorses compliance with all of his medications  Review of Systems  Review of system was conducted and was negative except for as stated in the HPI        Historical Information   Past Medical History:   Diagnosis Date    BPPV (benign paroxysmal positional vertigo)     vestibular    CAD (coronary artery disease) 2021    Diabetes mellitus ([facility] [address] )     Ear problems     fungal infections    Hypertension      Past Surgical History:   Procedure Laterality Date    BUNIONECTOMY Right 2006    BUNIONECTOMY  2008     Social History     Substance and Sexual Activity   Alcohol Use Not Currently     Social History     Substance and Sexual Activity   Drug Use Never     Social History     Tobacco Use   Smoking Status Former Smoker    Years: 5 00    Types: Cigarettes    Quit date: 1980    Years since quittin 0   Smokeless Tobacco Never Used     Family History:   Family History   Problem Relation Age of Onset    Hearing loss Mother     Hypertension Mother     Hyperlipidemia Mother     Hyperlipidemia Father     Hypertension Father     Diabetes Paternal Grandmother     Hearing loss Paternal Grandfather     Diabetes Maternal Uncle     Diabetes Paternal Uncle        Meds/Allergies   Home Medications:   Current Outpatient Medications:     aspirin (ECOTRIN LOW STRENGTH) 81 mg EC tablet, Take 81 mg by mouth daily, Disp: , Rfl:     atorvastatin (LIPITOR) 40 mg tablet, Take 40 mg by mouth every evening, Disp: , Rfl:     clopidogrel (PLAVIX) 75 mg tablet, Take 1 tablet (75 mg total) by mouth daily, Disp: 30 tablet, Rfl: 0    DULoxetine (CYMBALTA) 60 mg delayed release capsule, , Disp: , Rfl:     loratadine (CLARITIN) 10 mg tablet, Take 10 mg by mouth daily, Disp: , Rfl:     losartan (COZAAR) 25 mg tablet, Take 1 tablet (25 mg total) by mouth daily, Disp: 30 tablet, Rfl: 0    metFORMIN (GLUCOPHAGE) 500 mg tablet, Take 500 mg by mouth 2 (two) times a day with meals, Disp: , Rfl:    Methylcobalamin (B12-ACTIVE PO), Take by mouth, Disp: , Rfl:     metoprolol tartrate (LOPRESSOR) 50 mg tablet, Take 1 tablet (50 mg total) by mouth every 12 (twelve) hours, Disp: 60 tablet, Rfl: 0    Onglyza 5 MG tablet, , Disp: , Rfl:     pantoprazole (PROTONIX) 40 mg tablet, Take 1 tablet (40 mg total) by mouth daily in the early morning, Disp: 30 tablet, Rfl: 0    VITAMIN D PO, Take by mouth, Disp: , Rfl:     Allergies   Allergen Reactions    Codeine Rash         Objective   Vitals: There were no vitals taken for this visit        Physical Exam  GEN: Mally Patino appears well, alert and oriented x 3, pleasant and cooperative   HEENT:  Normocephalic, atraumatic, anicteric, moist mucous membranes  NECK: No JVD or carotid bruits   HEART: reg rhythm, reg rate, normal S1 and S2, no murmurs, clicks, gallops or rubs   LUNGS: Clear to auscultation bilaterally; no wheezes, rales, or rhonchi; respiration nonlabored   ABDOMEN:  Normoactive bowel sounds, soft, no tenderness, no distention  EXTREMITIES: peripheral pulses palpable; no edema  NEURO: no gross focal findings; cranial nerves grossly intact   SKIN:  Dry, intact, warm to touch    Lab Results:   CBC with diff:     CMP:       Invalid input(s): ALBUMIN  Troponin:   0   Lab Value Date/Time    TROPONINI 0 09 (H) 07/30/2021 0046    TROPONINI 0 09 (H) 07/29/2021 2123    TROPONINI 0 05 (H) 07/29/2021 1829    TROPONINI <0 02 07/29/2021 1513     BNP:     Coags:     TSH:     Magnesium:     Lipid Profile:     Lab Results   Component Value Date    TRIG 410 (H) 07/29/2021    HDL 36 (L) 07/29/2021    LDLCALC  07/29/2021      Comment:      Calculated LDL invalid, triglycerides >400 mg/dl     Lab Results   Component Value Date    K 3 8 08/02/2021    CO2 24 08/02/2021     08/02/2021    BUN 19 08/02/2021    CREATININE 1 27 08/02/2021    ALT 77 07/29/2021    AST 55 (H) 07/29/2021     Lab Results   Component Value Date    WBC 9 25 08/02/2021    HGB 13 5 08/02/2021    HCT 41 3 2021    MCV 87 2021     2021     Lab Results   Component Value Date    INR 0 95 2021         Imaging: I have personally reviewed pertinent reports  Previous STRESS TEST:  No results found for this or any previous visit  No results found for this or any previous visit  No results found for this or any previous visit  Previous Cath/PCI:  No results found for this or any previous visit  ECHO:  Results for orders placed during the hospital encounter of 21    Echo complete with contrast if indicated    Narrative  [address]  [facility], [address] [facility]  [phone]    Transthoracic Echocardiogram  Limited 2D, Doppler, and Color Doppler    Study date:  2021    Patient: Benjamin Bautista  MR number: [id number]  Account number: [de-identified]  : 1955  Age: 77 years  Gender: Male  Status: Inpatient  Location: Bedside  Height: 73 in  Weight: 193 lb  BP: 117/ 70 mmHg    Indications: Suspected Interatrial Thrombus    Diagnoses: I23 6 - Thrombosis of atrium, auricular appendage, and ventricle as current complications following acute my    Sonographer:  Jaz Mary RDCS  Primary Physician:  Whitney Dockery MD  Referring Physician:  Jaz Mary MD  Group:  [facility] [address]  Cardiology Fellow: Sheryl Mc MD  Interpreting Physician:  Jaz Mary MD    SUMMARY    ATRIAL SEPTUM:  There was a possible, medium-sized, spherical, echogenic, mobile mass on the right side of the interatrial septum  This may represent possible liopmatous hypertrophy but cannot rule out other etiologies  RECOMMENDATIONS:  If clinically indicated, transesophageal echocardiography could provide additional information  HISTORY: PRIOR HISTORY: DM2, HTN, HLD, Cardiac Arrest    PROCEDURE: The procedure was performed at the bedside  This was a routine study  The transthoracic approach was used   The study included limited 2D imaging, limited spectral Doppler, and color Doppler  The heart rate was 79 bpm, at the  start of the study  Images were obtained from the parasternal, apical, and subcostal acoustic windows  Image quality was adequate  ATRIAL SEPTUM: There was a possible, medium-sized, spherical, echogenic, mobile mass on the right side of the interatrial septum  This may represent possible liopmatous hypertrophy but cannot rule out other etiologies  [address] Accredited Echocardiography Laboratory    Prepared and electronically signed by    Eloina Cerrato MD  Signed 2021 12:03:49    Results for orders placed during the hospital encounter of 21    EBONY    Narrative  [facility] [address]  [facility], [address] [facility]  [phone]    Transesophageal Echocardiogram  Limited 2D, 3D, Doppler, and Color Doppler    Study date:  02-Aug-2021    Patient: Dwayne Murdokc  MR number: [id number]  Account number: [de-identified]  : 1955  Age: 77 years  Gender: Male  Status: Inpatient  Location: Echo lab  Height: 73 in  Weight: 197 lb  BP: 126/ 63 mmHg    Indications: Cardiac mass    Diagnoses: D15 1 - Benign neoplasm of heart    Sonographer:  NASIR Wright  Primary Physician:  Laura Umana MD  Referring Physician:  Eloina Cerrato MD  Group:  [place] [facility] [id number] [facility]  Cardiology Fellow: Donnell Poole MD  RN:  Milvia Jacobs RN  Interpreting Physician:  Suellen Johns MD    SUMMARY    LEFT VENTRICLE:  Systolic function was at the lower limits of normal  Ejection fraction was estimated to be 50 %  There was hypokinesis of the basal inferior and basal-mid inferolateral wall(s)  There was no evidence of concentric hypertrophy  LEFT ATRIAL APPENDAGE:  The size was normal   The function was normal (normal emptying velocity)  No thrombus was identified  ATRIAL SEPTUM:  No defect or patent foramen ovale was identified      RIGHT ATRIUM:  Size was normal   There was no evidence of a mass  No thrombus was identified  MITRAL VALVE:  There was trace regurgitation  TRICUSPID VALVE:  There was trace regurgitation  HISTORY: PRIOR HISTORY: Hypertension; Hyperlipidemia; DM2; Atrial Fibrillation; CAD; Atrial mass; Cardiac Arrest    PROCEDURE: The procedure was performed in the echo lab  This was a routine study  The risks and alternatives of the procedure were explained to the patient and informed consent was obtained  The transesophageal approach was used  The study  included limited 2D imaging, 3D imaging, limited spectral Doppler, and color Doppler  The heart rate was 76 bpm, at the start of the study  An adult omniplane probe was inserted by the cardiology fellow under direct supervision of the  attending cardiologist  Intubated with ease  One intubation attempt(s)  There was no blood detected on the probe  Image quality was adequate  There were no complications during the procedure  MEDICATIONS: Anesthesia administered by  anesthesia team     LEFT VENTRICLE: Size was normal  Systolic function was at the lower limits of normal  Ejection fraction was estimated to be 50 %  There was hypokinesis of the basal inferior and basal-mid inferolateral wall(s)  Wall thickness was normal   There was no evidence of concentric hypertrophy  DOPPLER: The study was not technically sufficient to allow evaluation of LV diastolic function  RIGHT VENTRICLE: The size was normal  Systolic function was normal     LEFT ATRIUM: Size was normal  No thrombus was identified  APPENDAGE: The size was normal  No thrombus was identified  DOPPLER: The function was normal (normal emptying velocity)  ATRIAL SEPTUM: No defect or patent foramen ovale was identified  RIGHT ATRIUM: Size was normal  There was no evidence of a mass  No thrombus was identified  MITRAL VALVE: Valve structure was normal  There was normal leaflet separation   There was no echocardiographic evidence of vegetation  DOPPLER: There was trace regurgitation  AORTIC VALVE: The valve was trileaflet  Leaflets exhibited normal thickness and normal cuspal separation  There was no echocardiographic evidence of vegetation  DOPPLER: There was no regurgitation  TRICUSPID VALVE: The valve structure was normal  There was normal leaflet separation  There was no echocardiographic evidence of vegetation  DOPPLER: There was trace regurgitation  PULMONIC VALVE: Leaflets exhibited normal thickness, no calcification, and normal cuspal separation  There was no echocardiographic evidence of vegetation  PERICARDIUM: There was no pericardial effusion  AORTA: The root exhibited normal size  There was no atheroma  There was no evidence for dissection  There was no evidence for aneurysm  [address] Accredited Echocardiography Laboratory    Prepared and electronically signed by    Bc Dove MD  Signed 02-Aug-2021 15:48:23      HOLTER  No results found for this or any previous visit  Assessment/Plan     Assessment:  1  History Cardiac Arrest (8/2/21) In setting of Obstructive CAD s/p PCI to mLCx  And OM1  2  Paroxsymal  Atrial Fibrillation, New Onset in setting of post cardiac arrest  --> [id number]: 3  3  HTN // HLD  4  DM2     Plan:  - c/w Aspirin 81mg PO Daily  - c/w Plavix 75mg PO Daily  - c/w Lipitor 40mg PO QHS  - d/c Metoprolol Tart  50mg PO BID  --> Switch to Metoprolol Succinate 100mg PO Daily  - c/w Losartan 25mg PO Daily  - Continue to follow EP, has to complete 4 weeks of Live event monitoring  Has 1 week remaining   - Patient request 90 day supply on his refills (sent to pharmacy as such)  - Will call patient next week about resuming driving once Zio Patch reports return  Case discussed and reviewed with Dr Carrillo Steel who agrees with my assessment and plan  Thank you for involving us in the care of your patient        Catie Stiles MD  Cardiology Fellow PGY-5      ==========================================================================================    Epic/ Allscripts/Care Everywhere records reviewed:     ** Please Note: Fluency DirectDictation voice to text software may have been used in the creation of this document   **

## 2021-08-24 ENCOUNTER — OFFICE VISIT (OUTPATIENT)
Dept: CARDIOLOGY CLINIC | Facility: CLINIC | Age: 66
End: 2021-08-24
Payer: MEDICARE

## 2021-08-24 VITALS
DIASTOLIC BLOOD PRESSURE: 70 MMHG | SYSTOLIC BLOOD PRESSURE: 120 MMHG | WEIGHT: 204.3 LBS | HEART RATE: 76 BPM | BODY MASS INDEX: 26.95 KG/M2

## 2021-08-24 DIAGNOSIS — I10 ESSENTIAL HYPERTENSION: ICD-10-CM

## 2021-08-24 DIAGNOSIS — E11.42 TYPE 2 DIABETES MELLITUS WITH PERIPHERAL NEUROPATHY (HCC): ICD-10-CM

## 2021-08-24 DIAGNOSIS — I21.21 ST ELEVATION MYOCARDIAL INFARCTION INVOLVING LEFT CIRCUMFLEX CORONARY ARTERY (HCC): Primary | ICD-10-CM

## 2021-08-24 DIAGNOSIS — I25.118 CORONARY ARTERY DISEASE OF NATIVE HEART WITH STABLE ANGINA PECTORIS, UNSPECIFIED VESSEL OR LESION TYPE (HCC): ICD-10-CM

## 2021-08-24 DIAGNOSIS — I48.0 PAROXYSMAL ATRIAL FIBRILLATION (HCC): ICD-10-CM

## 2021-08-24 DIAGNOSIS — E78.5 HYPERLIPIDEMIA, UNSPECIFIED HYPERLIPIDEMIA TYPE: ICD-10-CM

## 2021-08-24 PROCEDURE — 99214 OFFICE O/P EST MOD 30 MIN: CPT | Performed by: INTERNAL MEDICINE

## 2021-08-24 RX ORDER — METOPROLOL SUCCINATE 100 MG/1
100 TABLET, EXTENDED RELEASE ORAL DAILY
Qty: 90 TABLET | Refills: 1 | Status: SHIPPED | OUTPATIENT
Start: 2021-08-24

## 2021-08-24 RX ORDER — ATORVASTATIN CALCIUM 40 MG/1
40 TABLET, FILM COATED ORAL EVERY EVENING
Qty: 90 TABLET | Refills: 1 | Status: SHIPPED | OUTPATIENT
Start: 2021-08-24 | End: 2022-05-23 | Stop reason: SDUPTHER

## 2021-08-24 RX ORDER — ASPIRIN 81 MG/1
81 TABLET ORAL DAILY
Qty: 90 TABLET | Refills: 1 | Status: SHIPPED | OUTPATIENT
Start: 2021-08-24

## 2021-08-24 RX ORDER — CLOPIDOGREL BISULFATE 75 MG/1
75 TABLET ORAL DAILY
Qty: 90 TABLET | Refills: 1 | Status: SHIPPED | OUTPATIENT
Start: 2021-08-24

## 2021-08-24 RX ORDER — LOSARTAN POTASSIUM 25 MG/1
25 TABLET ORAL DAILY
Qty: 90 TABLET | Refills: 1 | Status: SHIPPED | OUTPATIENT
Start: 2021-08-24

## 2021-08-26 ENCOUNTER — CLINICAL SUPPORT (OUTPATIENT)
Dept: CARDIOLOGY CLINIC | Facility: CLINIC | Age: 66
End: 2021-08-26
Payer: MEDICARE

## 2021-08-26 DIAGNOSIS — I46.9 CARDIAC ARREST (HCC): ICD-10-CM

## 2021-08-29 PROCEDURE — 93228 REMOTE 30 DAY ECG REV/REPORT: CPT | Performed by: INTERNAL MEDICINE

## 2021-08-30 NOTE — RESULT ENCOUNTER NOTE
Normal Device FunctionPredominantly sinus rhythm Very brief SVT No significant PVC No significant PAC

## 2021-09-08 ENCOUNTER — CLINICAL SUPPORT (OUTPATIENT)
Dept: CARDIOLOGY CLINIC | Facility: CLINIC | Age: 66
End: 2021-09-08

## 2021-09-08 DIAGNOSIS — I46.9 CARDIAC ARREST (HCC): Primary | ICD-10-CM

## 2021-09-08 PROCEDURE — RECHECK

## 2021-09-10 ENCOUNTER — TELEPHONE (OUTPATIENT)
Dept: CARDIOLOGY CLINIC | Facility: CLINIC | Age: 66
End: 2021-09-10

## 2021-09-10 NOTE — TELEPHONE ENCOUNTER
Pt called downstairs, LMOM asking for zio results as he would like to get back to driving  As per Tedra Lombard, Dr Gilberto Art states pt needs to be cleared to drive from EP provider  Please advise

## 2021-09-12 NOTE — TELEPHONE ENCOUNTER
This patient had a VF arrest  He was resuscitated   He underwent PCI and revascularization  Hence we could not immediately proceed with ICD  He did not meet any of the modified guidelines of Yumiko et al as well      The current Zio patch was done just to confirm that he is not having any significant ventricular arrhythmias at this time      However to return to driving he has to meet the guidelines as included above  This will necessitate a 3 month or 6 months wait     There is subjectivity  I am including all our other EP for any other opinion with regards to clearance for driving

## 2021-09-21 ENCOUNTER — CLINICAL SUPPORT (OUTPATIENT)
Dept: CARDIAC REHAB | Age: 66
End: 2021-09-21
Payer: MEDICARE

## 2021-09-21 DIAGNOSIS — I21.21 ST ELEVATION MYOCARDIAL INFARCTION INVOLVING LEFT CIRCUMFLEX CORONARY ARTERY (HCC): ICD-10-CM

## 2021-09-21 PROCEDURE — 93797 PHYS/QHP OP CAR RHAB WO ECG: CPT

## 2021-09-21 NOTE — PROGRESS NOTES
Cardiac Rehabilitation Plan of Care   Initial Care Plan          Today's date: 2021   # of Exercise Sessions Completed: Initial Evaluation Today  Patient name: Siddhartha Velazquez      : 1955  Age: 74 y o  MRN: [id number]  Referring Physician: Fco Decker MD  Cardiologist: Fco Decker MD  Provider: Faith  Clinician: John Dominique, MS, CEP, CCRP      Dx:   Encounter Diagnosis   Name Primary?  ST elevation myocardial infarction involving left circumflex coronary artery [facility])      Date of onset: 21      SUMMARY OF PROGRESS:  Today is Rober's initial evaluation to begin Cardiac Rehab post STEMI involving the left circumflex artery, cardiac arrest while exercising at the fitness center  4 rounds of chest compressions followed by one shock  The patient has not returned to an exercise program since his event  he has resumed allADLs without limitations  Depression screening using the PHQ-9 interprets the patient's score 0 =No Depression  MERLE-7 screening tool for anxiety suggests 0-4  = Not anxious  When addressed, the patient denies having depression/anxiety  Patient reports excellent social/emotional support  Information to begin using [employer] was provided as well as contact information for counseling through [employer]  PHQ-9 score will be reassessed in 30 days  The patient is a non-smoker  Patient admits to 100% medication compliance  Patient reports the following physical limitations: peripheral neuropathy  The patient completed an initial submaximal TM ETT  The patient completed 1:30 minutes of stage IV  (5 8METs) with test termination of RHR +30  Resting  /60 with appropriate hemodynamic response to exercise reaching 198/70  Patient denied symptoms during exercise  Telemetry revealed NSR, no ectopy    Patient was counseled on exercise guidelines to achieve a minimum of 150 mins/wk of moderate intensity (RPE 4-6) exercise and encouraged to add 1-2 days of exercise on opposite days of cardiac rehab as tolerated  We discussed current dietary habits and goals of heart healthy eating for lipid management and diabetes management  The patient has IFG, Patient reported fasting 's, no insulin, Pt admits that he does not monitor BG as instructed  Patient's goals include: return to hiking and regular exercise at the fitness center  The patient's CAD risk factors include:  hypertension, hyperlipidemia and diabetes  His education will focus on lifestyle modification/education specific to His needs  Patient will attend group education classes on heart healthy eating, reading food labels, stress management, risk factor reduction, understanding heart disease and common heart medications  Patient will attend 35 monitored exercise sessions, 3x/wk for 12-18 weeks beginning 2021           Medication compliance: Yes   Comments: Pt reports to be compliant with medications  Fall Risk: Low   Comments: Ambulates with a steady gait with no assist device and Denies a fall in the past 6 months    EKG Interpretation: NSR      EXERCISE ASSESSMENT and PLAN    Current Exercise Program in Rehab:       Frequency: 3 days/week Supplement with home exercise 2+ days/wk as tolerated       Minutes: 30-40         METS: 2 0-4 0            HR: RHR +30   RPE: 4-5         Modalities: Treadmill, Airdyne bike, UBE and Lifecycle      Exercise Progression 30 Day Goals :    Frequency: 3 days/week of cardiac rehab     Supplement with home exercise 2+ days/wk as tolerated    Minutes: 40                            >150 mins/wk of moderate intensity exercise   METS: 3 0-5 5   HR: RHR+30    RPE: 4-6   Modalities: Treadmill, Lifecycle, Elliptical and Rower    Strength trainin-3 days / week  12-15 repetitions  1-2 sets per modality    Modalities: Leg Press, Chest Press, Pull Downs and Lateral Raise    Home Exercise: none since event    Goals: 10% improvement in functional capacity - based on max METs achieved in fitness assessment, Increase in exercise capacity by 40% - based on peak METs tolerated in cardiac rehab exercise session, Exercise 5 days/wk, >150mins/wk of moderate intensity exercise, Attend Rehab regularly, return to regular exercise at the gym and return to hiking    Progression Toward Goals:  Reviewed Pt goals and determined plan of care    Education: benefit of exercise for CAD risk factors, AHA guidelines to achieve >150 mins/wk of moderate exercise and RPE scale   Plan:education on home exercise guidelines, home exercise 30+ mins 2 days opposite CR and Education class: Risk Factors for Heart Disease  Readiness to change: Action:  (Changing behavior)      NUTRITION ASSESSMENT AND PLAN    Weight control:    Starting weight: 200 2   Current weight:     Waist circumference:    Starting:    Current:      Diabetes: T2D, Patient reported fasting 's, no insulin - Pt admits that he does not monitor BG as instructed  A1c: 7 6    last measured: 7/29/21    Lipid management: Discussed diet and lipid management and Last lipid profile 7/29/21  Chol 156    HDL 36  LDL unable to calculate  TRG too high    Goals:reduced BMI to < 25, HDL >40, TRG <150, reduced waist circumference <40 inches (M), fasting BG , improved A1c  < 7 0%, Improved Rate Your Plate score  >64, choose lean meat (93-95%), reduce portion sizes of meat to 3oz or less, reduce cheese intake or use reduced-fat, choose healthy snacks: light popcorn, plain pretzels, seldom eat or choose low fat ice-cream, fruit juice bars or frozen yogurt , eliminate or choose low-fat sweets and daily saturated fat intake <7%/13g    Progression Toward Goals: Reviewed Pt goals and determined plan of care    Education: heart healthy eating  low sodium diet  nutrition for  lipid management  nutrition for Improved BG control  target goal for A1c <7 0  exercise and diabetes management   Plan: Education class: Reading Food Labels, Education Class:  Heart Healthy Eating, switch to low fat cheeses, replace unhealthy snacks with fruits & vegs, reduce portion sizes, reduce red meat 1x/wk, avoid processed foods, will try new grains like brown rice, quinoa, farro, monitor home blood glucose, learn how to read food labels, replace sugar with stevia or truvia and keep added daily sugar <25g/day  Readiness to change: Preparation:  (Getting ready to change)       PSYCHOSOCIAL ASSESSMENT AND PLAN    Emotional:  Depression assessment:  PHQ-9 = 0 =No Depression            Anxiety measure:  MERLE-7 = 0-4  = Not anxious  Self-reported stress level:  1  Social support: Excellent and Patient reports excellent emotional/social support from family    Goals:  Physical Fitness in [facility] Score < 3, Change in Health in [place] Score < 3  and retun to gym unrestricted without worry    Progression Toward Goals: Reviewed Pt goals and determined plan of care    Education: benefits of a positive support system and depression and CAD  Plan: Class: Stress and Your Health, Class: Relaxation, Exercise, Spend time outdoors, Enjoy a hobby and Return to previous social activity  Readiness to change: Preparation:  (Getting ready to change)       OTHER CORE COMPONENTS     Tobacco:   Social History     Tobacco Use   Smoking Status Former Smoker    Years: 5 00    Types: Cigarettes    Quit date: 1980    Years since quittin 1   Smokeless Tobacco Never Used       Tobacco Use Intervention:   N/A:  Patient is a non-smoker     Anginal Symptoms:  None   NTG use: No prescription    Blood pressure:    Restin/60   Exercise: 198/70    Goals: consistent BP < 130/80, reduced dietary sodium <2300mg, moderate intensity exercise >150 mins/wk and medication compliance    Progression Toward Goals: Reviewed Pt goals and determined plan of care    Education:  low sodium diet and HTN  Plan: Class: Understanding Heart Disease, Class: Common Heart Medications, Avoid Processed foods, engage in regular exercise, eliminate salt shaker at the table and check labels for sodium content  Readiness to change: Preparation:  (Getting ready to change)

## 2021-09-21 NOTE — PROGRESS NOTES
CARDIAC REHAB ASSESSMENT    Today's date: 2021  Patient name: David Kinney     : 1955       MRN: [id number]  PCP: Marcus Mojica MD  Referring Physician: Betzaida Shannon MD  Cardiologist: Betzaida Shannon MD  Surgeon:   Dx:   Encounter Diagnosis   Name Primary?     ST elevation myocardial infarction involving left circumflex coronary artery ([facility] )        Date of onset: 21  Cultural needs: none    Height:    Wt Readings from Last 1 Encounters:   21 92 7 kg (204 lb 4 8 oz)      Weight:   Ht Readings from Last 1 Encounters:   21 6' 1" (1 854 m)     Medical History:   Past Medical History:   Diagnosis Date    BPPV (benign paroxysmal positional vertigo)     vestibular    CAD (coronary artery disease) 2021    Diabetes mellitus ([facility] )     Ear problems     fungal infections    Hypertension          Physical Limitations: peripheral neuropathy - fingers/feet    Fall Risk: Low   Comments: Ambulates with a steady gait with no assist device    Anginal Equivalent: None/denies angina   NTG use: No prescription    Risk Factors   Cholesterol: Yes  Smoking: Former user - remote  - 16 to 24  HTN: Yes  DM: Type 2   average 's -a high number is 180  Is supposed to monitor daily BG but does not because of his neuropathy  Obesity: No   Inactivity: No  Stress:  perceived  stress: 1/10   Stressors:none   Goals for Stress Management:Exercise    Family History:  Family History   Problem Relation Age of Onset    Hearing loss Mother     Hypertension Mother     Hyperlipidemia Mother     Hyperlipidemia Father     Hypertension Father     Diabetes Paternal Grandmother     Hearing loss Paternal Grandfather     Diabetes Maternal Uncle     Diabetes Paternal Uncle        Allergies: Codeine  ETOH:   Social History     Substance and Sexual Activity   Alcohol Use Not Currently         Current Medications:   Current Outpatient Medications   Medication Sig Dispense Refill    aspirin (ECOTRIN LOW STRENGTH) 81 mg EC tablet Take 1 tablet (81 mg total) by mouth daily 90 tablet 1    atorvastatin (LIPITOR) 40 mg tablet Take 1 tablet (40 mg total) by mouth every evening 90 tablet 1    clopidogrel (PLAVIX) 75 mg tablet Take 1 tablet (75 mg total) by mouth daily 90 tablet 1    DULoxetine (CYMBALTA) 60 mg delayed release capsule daily       loratadine (CLARITIN) 10 mg tablet Take 10 mg by mouth daily      losartan (COZAAR) 25 mg tablet Take 1 tablet (25 mg total) by mouth daily 90 tablet 1    metFORMIN (GLUCOPHAGE) 500 mg tablet Take 500 mg by mouth 2 (two) times a day with meals      Methylcobalamin (B12-ACTIVE PO) Take by mouth      metoprolol succinate (TOPROL-XL) 100 mg 24 hr tablet Take 1 tablet (100 mg total) by mouth daily 90 tablet 1    Onglyza 5 MG tablet       pantoprazole (PROTONIX) 40 mg tablet Take 1 tablet (40 mg total) by mouth daily in the early morning 30 tablet 0    VITAMIN D PO Take by mouth       No current facility-administered medications for this visit           Functional Status Prior to Diagnosis for Treatment   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  Recreation: backpack/hiking all over the [place] - has hiked the entire [employer] trail - all but 400 miles  ADLs: No limitations  Boyle: No limitations  Exercise: [address] x 22 years:    Strength training, TM, rower (20 mins), elliptical - easily 40 mins - was at the gym about 1 5 hours  Other:     Current Functional Status  Occupation: retired  Recreation: hasn't hiked in a year - tried in May but turned around (he felt something was not right)  ADLs:No limitations  Boyle: No limitations  Exercise: none because he has been busy with maintaining the house  Other: wife had arm surgery 3 days prior to event:  After hospital d/c he was responsible for all house chores    Patient Specific Goals:  Return to hiking, return to the gym    Short Term

## 2021-09-24 ENCOUNTER — CLINICAL SUPPORT (OUTPATIENT)
Dept: CARDIAC REHAB | Age: 66
End: 2021-09-24
Payer: MEDICARE

## 2021-09-24 DIAGNOSIS — I21.21 ST ELEVATION MYOCARDIAL INFARCTION INVOLVING LEFT CIRCUMFLEX CORONARY ARTERY (HCC): Primary | ICD-10-CM

## 2021-09-24 PROCEDURE — 93798 PHYS/QHP OP CAR RHAB W/ECG: CPT

## 2021-09-27 ENCOUNTER — CLINICAL SUPPORT (OUTPATIENT)
Dept: CARDIAC REHAB | Age: 66
End: 2021-09-27
Payer: MEDICARE

## 2021-09-27 DIAGNOSIS — I21.21 ST ELEVATION MYOCARDIAL INFARCTION INVOLVING LEFT CIRCUMFLEX CORONARY ARTERY (HCC): Primary | ICD-10-CM

## 2021-09-27 PROCEDURE — 93798 PHYS/QHP OP CAR RHAB W/ECG: CPT

## 2021-09-29 ENCOUNTER — CLINICAL SUPPORT (OUTPATIENT)
Dept: CARDIAC REHAB | Age: 66
End: 2021-09-29
Payer: MEDICARE

## 2021-09-29 DIAGNOSIS — I21.21 ST ELEVATION MYOCARDIAL INFARCTION INVOLVING LEFT CIRCUMFLEX CORONARY ARTERY (HCC): ICD-10-CM

## 2021-09-29 PROCEDURE — 93798 PHYS/QHP OP CAR RHAB W/ECG: CPT

## 2021-10-01 ENCOUNTER — CLINICAL SUPPORT (OUTPATIENT)
Dept: CARDIAC REHAB | Age: 66
End: 2021-10-01
Payer: MEDICARE

## 2021-10-01 DIAGNOSIS — I21.21 ST ELEVATION MYOCARDIAL INFARCTION INVOLVING LEFT CIRCUMFLEX CORONARY ARTERY (HCC): ICD-10-CM

## 2021-10-01 PROCEDURE — 93798 PHYS/QHP OP CAR RHAB W/ECG: CPT

## 2021-10-04 ENCOUNTER — CLINICAL SUPPORT (OUTPATIENT)
Dept: CARDIAC REHAB | Age: 66
End: 2021-10-04
Payer: MEDICARE

## 2021-10-04 DIAGNOSIS — I21.21 ST ELEVATION MYOCARDIAL INFARCTION INVOLVING LEFT CIRCUMFLEX CORONARY ARTERY (HCC): ICD-10-CM

## 2021-10-04 PROCEDURE — 93798 PHYS/QHP OP CAR RHAB W/ECG: CPT

## 2021-10-06 ENCOUNTER — CLINICAL SUPPORT (OUTPATIENT)
Dept: CARDIAC REHAB | Age: 66
End: 2021-10-06
Payer: MEDICARE

## 2021-10-06 DIAGNOSIS — I21.21 ST ELEVATION MYOCARDIAL INFARCTION INVOLVING LEFT CIRCUMFLEX CORONARY ARTERY (HCC): ICD-10-CM

## 2021-10-06 PROCEDURE — 93798 PHYS/QHP OP CAR RHAB W/ECG: CPT

## 2021-10-08 ENCOUNTER — CLINICAL SUPPORT (OUTPATIENT)
Dept: CARDIAC REHAB | Age: 66
End: 2021-10-08
Payer: MEDICARE

## 2021-10-08 DIAGNOSIS — I21.21 ST ELEVATION MYOCARDIAL INFARCTION INVOLVING LEFT CIRCUMFLEX CORONARY ARTERY (HCC): ICD-10-CM

## 2021-10-08 PROCEDURE — 93798 PHYS/QHP OP CAR RHAB W/ECG: CPT

## 2021-10-11 ENCOUNTER — CLINICAL SUPPORT (OUTPATIENT)
Dept: CARDIAC REHAB | Age: 66
End: 2021-10-11
Payer: MEDICARE

## 2021-10-11 DIAGNOSIS — I21.21 ST ELEVATION MYOCARDIAL INFARCTION INVOLVING LEFT CIRCUMFLEX CORONARY ARTERY (HCC): ICD-10-CM

## 2021-10-11 PROCEDURE — 93798 PHYS/QHP OP CAR RHAB W/ECG: CPT

## 2021-10-13 ENCOUNTER — CLINICAL SUPPORT (OUTPATIENT)
Dept: CARDIAC REHAB | Age: 66
End: 2021-10-13
Payer: MEDICARE

## 2021-10-13 DIAGNOSIS — I21.21 ST ELEVATION MYOCARDIAL INFARCTION INVOLVING LEFT CIRCUMFLEX CORONARY ARTERY (HCC): ICD-10-CM

## 2021-10-13 PROCEDURE — 93798 PHYS/QHP OP CAR RHAB W/ECG: CPT

## 2021-10-15 ENCOUNTER — CLINICAL SUPPORT (OUTPATIENT)
Dept: CARDIAC REHAB | Age: 66
End: 2021-10-15
Payer: MEDICARE

## 2021-10-15 DIAGNOSIS — I21.21 ST ELEVATION MYOCARDIAL INFARCTION INVOLVING LEFT CIRCUMFLEX CORONARY ARTERY (HCC): ICD-10-CM

## 2021-10-15 PROCEDURE — 93798 PHYS/QHP OP CAR RHAB W/ECG: CPT

## 2021-10-18 ENCOUNTER — CLINICAL SUPPORT (OUTPATIENT)
Dept: CARDIAC REHAB | Age: 66
End: 2021-10-18
Payer: MEDICARE

## 2021-10-18 DIAGNOSIS — I21.21 ST ELEVATION MYOCARDIAL INFARCTION INVOLVING LEFT CIRCUMFLEX CORONARY ARTERY (HCC): Primary | ICD-10-CM

## 2021-10-18 PROCEDURE — 93798 PHYS/QHP OP CAR RHAB W/ECG: CPT

## 2021-10-20 ENCOUNTER — CLINICAL SUPPORT (OUTPATIENT)
Dept: CARDIAC REHAB | Age: 66
End: 2021-10-20
Payer: MEDICARE

## 2021-10-20 DIAGNOSIS — I21.21 ST ELEVATION MYOCARDIAL INFARCTION INVOLVING LEFT CIRCUMFLEX CORONARY ARTERY (HCC): ICD-10-CM

## 2021-10-20 PROCEDURE — 93798 PHYS/QHP OP CAR RHAB W/ECG: CPT

## 2021-10-22 ENCOUNTER — CLINICAL SUPPORT (OUTPATIENT)
Dept: CARDIAC REHAB | Age: 66
End: 2021-10-22
Payer: MEDICARE

## 2021-10-22 DIAGNOSIS — I21.21 ST ELEVATION MYOCARDIAL INFARCTION INVOLVING LEFT CIRCUMFLEX CORONARY ARTERY (HCC): ICD-10-CM

## 2021-10-22 PROCEDURE — 93798 PHYS/QHP OP CAR RHAB W/ECG: CPT

## 2021-10-25 ENCOUNTER — CLINICAL SUPPORT (OUTPATIENT)
Dept: CARDIAC REHAB | Age: 66
End: 2021-10-25
Payer: MEDICARE

## 2021-10-25 DIAGNOSIS — I21.21 ST ELEVATION MYOCARDIAL INFARCTION INVOLVING LEFT CIRCUMFLEX CORONARY ARTERY (HCC): ICD-10-CM

## 2021-10-25 PROCEDURE — 93798 PHYS/QHP OP CAR RHAB W/ECG: CPT

## 2021-10-27 ENCOUNTER — CLINICAL SUPPORT (OUTPATIENT)
Dept: CARDIAC REHAB | Age: 66
End: 2021-10-27
Payer: MEDICARE

## 2021-10-27 DIAGNOSIS — I21.21 ST ELEVATION MYOCARDIAL INFARCTION INVOLVING LEFT CIRCUMFLEX CORONARY ARTERY (HCC): ICD-10-CM

## 2021-10-27 PROCEDURE — 93798 PHYS/QHP OP CAR RHAB W/ECG: CPT

## 2021-10-29 ENCOUNTER — CLINICAL SUPPORT (OUTPATIENT)
Dept: CARDIAC REHAB | Age: 66
End: 2021-10-29
Payer: MEDICARE

## 2021-10-29 DIAGNOSIS — I21.21 ST ELEVATION MYOCARDIAL INFARCTION INVOLVING LEFT CIRCUMFLEX CORONARY ARTERY (HCC): ICD-10-CM

## 2021-10-29 PROCEDURE — 93798 PHYS/QHP OP CAR RHAB W/ECG: CPT

## 2021-11-01 ENCOUNTER — CLINICAL SUPPORT (OUTPATIENT)
Dept: CARDIAC REHAB | Age: 66
End: 2021-11-01
Payer: MEDICARE

## 2021-11-01 DIAGNOSIS — I21.21 ST ELEVATION MYOCARDIAL INFARCTION INVOLVING LEFT CIRCUMFLEX CORONARY ARTERY (HCC): ICD-10-CM

## 2021-11-01 PROCEDURE — 93798 PHYS/QHP OP CAR RHAB W/ECG: CPT

## 2021-11-03 ENCOUNTER — CLINICAL SUPPORT (OUTPATIENT)
Dept: CARDIAC REHAB | Age: 66
End: 2021-11-03
Payer: MEDICARE

## 2021-11-03 DIAGNOSIS — I21.21 ST ELEVATION MYOCARDIAL INFARCTION INVOLVING LEFT CIRCUMFLEX CORONARY ARTERY (HCC): ICD-10-CM

## 2021-11-03 PROCEDURE — 93798 PHYS/QHP OP CAR RHAB W/ECG: CPT

## 2021-11-05 ENCOUNTER — CLINICAL SUPPORT (OUTPATIENT)
Dept: CARDIAC REHAB | Age: 66
End: 2021-11-05
Payer: MEDICARE

## 2021-11-05 DIAGNOSIS — I21.21 ST ELEVATION MYOCARDIAL INFARCTION INVOLVING LEFT CIRCUMFLEX CORONARY ARTERY (HCC): ICD-10-CM

## 2021-11-05 PROCEDURE — 93798 PHYS/QHP OP CAR RHAB W/ECG: CPT

## 2021-11-08 ENCOUNTER — CLINICAL SUPPORT (OUTPATIENT)
Dept: CARDIAC REHAB | Age: 66
End: 2021-11-08
Payer: MEDICARE

## 2021-11-08 DIAGNOSIS — I21.21 ST ELEVATION MYOCARDIAL INFARCTION INVOLVING LEFT CIRCUMFLEX CORONARY ARTERY (HCC): ICD-10-CM

## 2021-11-08 PROCEDURE — 93798 PHYS/QHP OP CAR RHAB W/ECG: CPT

## 2021-11-10 ENCOUNTER — CLINICAL SUPPORT (OUTPATIENT)
Dept: CARDIAC REHAB | Age: 66
End: 2021-11-10
Payer: MEDICARE

## 2021-11-10 DIAGNOSIS — I21.21 ST ELEVATION MYOCARDIAL INFARCTION INVOLVING LEFT CIRCUMFLEX CORONARY ARTERY (HCC): ICD-10-CM

## 2021-11-10 PROCEDURE — 93798 PHYS/QHP OP CAR RHAB W/ECG: CPT

## 2021-11-12 ENCOUNTER — CLINICAL SUPPORT (OUTPATIENT)
Dept: CARDIAC REHAB | Age: 66
End: 2021-11-12
Payer: MEDICARE

## 2021-11-12 DIAGNOSIS — I21.21 ST ELEVATION MYOCARDIAL INFARCTION INVOLVING LEFT CIRCUMFLEX CORONARY ARTERY (HCC): ICD-10-CM

## 2021-11-12 PROCEDURE — 93798 PHYS/QHP OP CAR RHAB W/ECG: CPT

## 2021-11-15 ENCOUNTER — CLINICAL SUPPORT (OUTPATIENT)
Dept: CARDIAC REHAB | Age: 66
End: 2021-11-15
Payer: MEDICARE

## 2021-11-15 DIAGNOSIS — I21.21 ST ELEVATION MYOCARDIAL INFARCTION INVOLVING LEFT CIRCUMFLEX CORONARY ARTERY (HCC): Primary | ICD-10-CM

## 2021-11-15 PROCEDURE — 93798 PHYS/QHP OP CAR RHAB W/ECG: CPT

## 2021-11-17 ENCOUNTER — CLINICAL SUPPORT (OUTPATIENT)
Dept: CARDIAC REHAB | Age: 66
End: 2021-11-17
Payer: MEDICARE

## 2021-11-17 DIAGNOSIS — I21.21 ST ELEVATION MYOCARDIAL INFARCTION INVOLVING LEFT CIRCUMFLEX CORONARY ARTERY (HCC): Primary | ICD-10-CM

## 2021-11-17 PROCEDURE — 93798 PHYS/QHP OP CAR RHAB W/ECG: CPT

## 2021-11-19 ENCOUNTER — CLINICAL SUPPORT (OUTPATIENT)
Dept: CARDIAC REHAB | Age: 66
End: 2021-11-19
Payer: MEDICARE

## 2021-11-19 DIAGNOSIS — I21.21 ST ELEVATION MYOCARDIAL INFARCTION INVOLVING LEFT CIRCUMFLEX CORONARY ARTERY (HCC): ICD-10-CM

## 2021-11-19 PROCEDURE — 93798 PHYS/QHP OP CAR RHAB W/ECG: CPT

## 2021-11-22 ENCOUNTER — CLINICAL SUPPORT (OUTPATIENT)
Dept: CARDIAC REHAB | Age: 66
End: 2021-11-22
Payer: MEDICARE

## 2021-11-22 DIAGNOSIS — I21.21 ST ELEVATION MYOCARDIAL INFARCTION INVOLVING LEFT CIRCUMFLEX CORONARY ARTERY (HCC): ICD-10-CM

## 2021-11-22 PROCEDURE — 93798 PHYS/QHP OP CAR RHAB W/ECG: CPT

## 2021-11-24 ENCOUNTER — CLINICAL SUPPORT (OUTPATIENT)
Dept: CARDIAC REHAB | Age: 66
End: 2021-11-24
Payer: MEDICARE

## 2021-11-24 DIAGNOSIS — I21.21 ST ELEVATION MYOCARDIAL INFARCTION INVOLVING LEFT CIRCUMFLEX CORONARY ARTERY (HCC): ICD-10-CM

## 2021-11-24 PROCEDURE — 93798 PHYS/QHP OP CAR RHAB W/ECG: CPT

## 2021-11-26 ENCOUNTER — CLINICAL SUPPORT (OUTPATIENT)
Dept: CARDIAC REHAB | Age: 66
End: 2021-11-26
Payer: MEDICARE

## 2021-11-26 DIAGNOSIS — I21.21 ST ELEVATION MYOCARDIAL INFARCTION INVOLVING LEFT CIRCUMFLEX CORONARY ARTERY (HCC): ICD-10-CM

## 2021-11-26 PROCEDURE — 93798 PHYS/QHP OP CAR RHAB W/ECG: CPT

## 2021-11-29 ENCOUNTER — CLINICAL SUPPORT (OUTPATIENT)
Dept: CARDIAC REHAB | Age: 66
End: 2021-11-29
Payer: MEDICARE

## 2021-11-29 DIAGNOSIS — I21.21 ST ELEVATION MYOCARDIAL INFARCTION INVOLVING LEFT CIRCUMFLEX CORONARY ARTERY (HCC): ICD-10-CM

## 2021-11-29 PROCEDURE — 93798 PHYS/QHP OP CAR RHAB W/ECG: CPT

## 2021-12-01 ENCOUNTER — CLINICAL SUPPORT (OUTPATIENT)
Dept: CARDIAC REHAB | Age: 66
End: 2021-12-01
Payer: MEDICARE

## 2021-12-01 DIAGNOSIS — I21.21 ST ELEVATION MYOCARDIAL INFARCTION INVOLVING LEFT CIRCUMFLEX CORONARY ARTERY (HCC): ICD-10-CM

## 2021-12-01 PROCEDURE — 93798 PHYS/QHP OP CAR RHAB W/ECG: CPT

## 2021-12-03 ENCOUNTER — CLINICAL SUPPORT (OUTPATIENT)
Dept: CARDIAC REHAB | Age: 66
End: 2021-12-03
Payer: MEDICARE

## 2021-12-03 DIAGNOSIS — I21.21 ST ELEVATION MYOCARDIAL INFARCTION INVOLVING LEFT CIRCUMFLEX CORONARY ARTERY (HCC): ICD-10-CM

## 2021-12-03 PROCEDURE — 93798 PHYS/QHP OP CAR RHAB W/ECG: CPT

## 2021-12-06 ENCOUNTER — CLINICAL SUPPORT (OUTPATIENT)
Dept: CARDIAC REHAB | Age: 66
End: 2021-12-06
Payer: MEDICARE

## 2021-12-06 DIAGNOSIS — I21.21 ST ELEVATION MYOCARDIAL INFARCTION INVOLVING LEFT CIRCUMFLEX CORONARY ARTERY (HCC): ICD-10-CM

## 2021-12-06 PROCEDURE — 93798 PHYS/QHP OP CAR RHAB W/ECG: CPT

## 2021-12-08 ENCOUNTER — APPOINTMENT (OUTPATIENT)
Dept: CARDIAC REHAB | Age: 66
End: 2021-12-08
Payer: MEDICARE

## 2021-12-09 ENCOUNTER — CLINICAL SUPPORT (OUTPATIENT)
Dept: CARDIAC REHAB | Age: 66
End: 2021-12-09
Payer: MEDICARE

## 2021-12-09 DIAGNOSIS — I21.21 ST ELEVATION MYOCARDIAL INFARCTION INVOLVING LEFT CIRCUMFLEX CORONARY ARTERY (HCC): ICD-10-CM

## 2021-12-09 PROCEDURE — 93798 PHYS/QHP OP CAR RHAB W/ECG: CPT

## 2021-12-10 ENCOUNTER — TELEPHONE (OUTPATIENT)
Dept: CARDIOLOGY CLINIC | Facility: CLINIC | Age: 66
End: 2021-12-10

## 2021-12-10 ENCOUNTER — APPOINTMENT (OUTPATIENT)
Dept: CARDIAC REHAB | Age: 66
End: 2021-12-10
Payer: MEDICARE

## 2021-12-13 ENCOUNTER — TELEPHONE (OUTPATIENT)
Dept: NON INVASIVE DIAGNOSTICS | Facility: HOSPITAL | Age: 66
End: 2021-12-13

## 2021-12-13 ENCOUNTER — CLINICAL SUPPORT (OUTPATIENT)
Dept: CARDIAC REHAB | Age: 66
End: 2021-12-13
Payer: MEDICARE

## 2021-12-13 DIAGNOSIS — I21.21 ST ELEVATION MYOCARDIAL INFARCTION INVOLVING LEFT CIRCUMFLEX CORONARY ARTERY (HCC): ICD-10-CM

## 2021-12-13 PROCEDURE — 93798 PHYS/QHP OP CAR RHAB W/ECG: CPT

## 2021-12-15 ENCOUNTER — CLINICAL SUPPORT (OUTPATIENT)
Dept: CARDIAC REHAB | Age: 66
End: 2021-12-15
Payer: MEDICARE

## 2021-12-15 DIAGNOSIS — I21.21 ST ELEVATION MYOCARDIAL INFARCTION INVOLVING LEFT CIRCUMFLEX CORONARY ARTERY (HCC): ICD-10-CM

## 2021-12-15 PROCEDURE — 93798 PHYS/QHP OP CAR RHAB W/ECG: CPT

## 2021-12-17 ENCOUNTER — APPOINTMENT (OUTPATIENT)
Dept: CARDIAC REHAB | Age: 66
End: 2021-12-17
Payer: MEDICARE

## 2022-02-21 NOTE — PROGRESS NOTES
Outpatient Progress Note - General Cardiology   Megan Soliman 74 y o  male   MRN: [id number]  Encounter: [id number]    Interval History:  Patient has no complaints since last visit  Has resumed regular activity without limitations  States he is in great spirits and doing exceptionally well  Cardiac History: Megan Soliman is a 74y o  year old male  with a history of HTN, HLD, DM2 (A1c 7 8%)  Patient was seen by me in the ED on 21 where he presented with a cardiac arrest episode  Patient stated that he was at the gym where he did a 1 5 hour work out and towards the end while doing a cool down on the rowing machine he suddenly felt lightheaded and dizzy  He subsequently passed out before bystanders arrived  He underwent 4 rounds of CPR and an AED was placed which advised shock x1  Patient arrived to the ED and EKG was notable for Atrial Fibrillation (patient denied a history of this prior to present episode)  He underwent LHC and is s/p PCI of mLCx  And OM1  Review of Systems  Review of system was conducted and was negative except for as stated in the HPI        Historical Information   Past Medical History:   Diagnosis Date    BPPV (benign paroxysmal positional vertigo)     vestibular    CAD (coronary artery disease) 2021    Diabetes mellitus ([address] )     Ear problems     fungal infections    Hypertension      Past Surgical History:   Procedure Laterality Date    BUNIONECTOMY Right 2006    BUNIONECTOMY       Social History     Substance and Sexual Activity   Alcohol Use Not Currently     Social History     Substance and Sexual Activity   Drug Use Never     Social History     Tobacco Use   Smoking Status Former Smoker    Years: 5 00    Types: Cigarettes    Quit date: 1980    Years since quittin 5   Smokeless Tobacco Never Used     Family History:   Family History   Problem Relation Age of Onset    Hearing loss Mother     Hypertension Mother     Hyperlipidemia Mother    [employer] Hyperlipidemia Father     Hypertension Father     Diabetes Paternal Grandmother     Hearing loss Paternal Grandfather     Diabetes Maternal Uncle     Diabetes Paternal Uncle        Meds/Allergies   Home Medications:   Current Outpatient Medications:     aspirin (ECOTRIN LOW STRENGTH) 81 mg EC tablet, Take 1 tablet (81 mg total) by mouth daily, Disp: 90 tablet, Rfl: 1    atorvastatin (LIPITOR) 40 mg tablet, Take 1 tablet (40 mg total) by mouth every evening, Disp: 90 tablet, Rfl: 1    clopidogrel (PLAVIX) 75 mg tablet, Take 1 tablet (75 mg total) by mouth daily, Disp: 90 tablet, Rfl: 1    DULoxetine (CYMBALTA) 60 mg delayed release capsule, daily , Disp: , Rfl:     loratadine (CLARITIN) 10 mg tablet, Take 10 mg by mouth daily, Disp: , Rfl:     losartan (COZAAR) 25 mg tablet, Take 1 tablet (25 mg total) by mouth daily, Disp: 90 tablet, Rfl: 1    metFORMIN (GLUCOPHAGE) 500 mg tablet, Take 500 mg by mouth 2 (two) times a day with meals, Disp: , Rfl:     Methylcobalamin (B12-ACTIVE PO), Take by mouth, Disp: , Rfl:     metoprolol succinate (TOPROL-XL) 100 mg 24 hr tablet, Take 1 tablet (100 mg total) by mouth daily, Disp: 90 tablet, Rfl: 1    Onglyza 5 MG tablet, , Disp: , Rfl:     pantoprazole (PROTONIX) 40 mg tablet, Take 1 tablet (40 mg total) by mouth daily in the early morning, Disp: 30 tablet, Rfl: 0    VITAMIN D PO, Take by mouth, Disp: , Rfl:     Allergies   Allergen Reactions    Codeine Rash         Objective   Vitals: There were no vitals taken for this visit        Physical Exam  GEN: Axel Patino appears well, alert and oriented x 3, pleasant and cooperative   HEENT:  Normocephalic, atraumatic, anicteric, moist mucous membranes  NECK: No JVD or carotid bruits   HEART: reg rhythm, reg rate, normal S1 and S2, no murmurs, clicks, gallops or rubs   LUNGS: Clear to auscultation bilaterally; no wheezes, rales, or rhonchi; respiration nonlabored   ABDOMEN:  Normoactive bowel sounds, soft, no tenderness, no distention  EXTREMITIES: peripheral pulses palpable; no edema  NEURO: no gross focal findings; cranial nerves grossly intact   SKIN:  Dry, intact, warm to touch    Lab Results:   CBC with diff:     CMP:       Invalid input(s): ALBUMIN  Troponin:   0   Lab Value Date/Time    TROPONINI 0 09 (H) 2021 0046    TROPONINI 0 09 (H) 2021 2123    TROPONINI 0 05 (H) 2021 1829    TROPONINI <0 02 2021 1513     BNP:     Coags:     TSH:     Magnesium:     Lipid Profile:     Lab Results   Component Value Date    TRIG 410 (H) 2021    HDL 36 (L) 2021    LDLCALC  2021      Comment:      Calculated LDL invalid, triglycerides >400 mg/dl     Lab Results   Component Value Date    K 3 8 2021    CO2 24 2021     2021    BUN 19 2021    CREATININE 1 27 2021    ALT 77 2021    AST 55 (H) 2021     Lab Results   Component Value Date    WBC 9 25 2021    HGB 13 5 2021    HCT 41 3 2021    MCV 87 2021     2021     Lab Results   Component Value Date    INR 0 95 2021         Imaging: I have personally reviewed pertinent reports  Previous STRESS TEST:  No results found for this or any previous visit  No results found for this or any previous visit  No results found for this or any previous visit  Previous Cath/PCI:  No results found for this or any previous visit        ECHO:  Results for orders placed during the hospital encounter of 21    Echo complete with contrast if indicated    Narrative  [facility]  [facility], [address] [facility]  [phone]    Transthoracic Echocardiogram  Limited 2D, Doppler, and Color Doppler    Study date:  2021    Patient: Nicolas Harrington  MR number: [id number]  Account number: [de-identified]  : 1955  Age: 77 years  Gender: Male  Status: Inpatient  Location: Bedside  Height: 73 in  Weight: 193 lb  BP: 117/ 70 mmHg    Indications: Suspected Interatrial Thrombus    Diagnoses: I23 6 - Thrombosis of atrium, auricular appendage, and ventricle as current complications following acute my    Sonographer:  Gary May RDCS  Primary Physician:  Darwin Hawley MD  Referring Physician:  Gary May MD  Group:  [facility]  Cardiology Fellow: Rebecca Burrows MD  Interpreting Physician:  Gary May MD    SUMMARY    ATRIAL SEPTUM:  There was a possible, medium-sized, spherical, echogenic, mobile mass on the right side of the interatrial septum  This may represent possible liopmatous hypertrophy but cannot rule out other etiologies  RECOMMENDATIONS:  If clinically indicated, transesophageal echocardiography could provide additional information  HISTORY: PRIOR HISTORY: DM2, HTN, HLD, Cardiac Arrest    PROCEDURE: The procedure was performed at the bedside  This was a routine study  The transthoracic approach was used  The study included limited 2D imaging, limited spectral Doppler, and color Doppler  The heart rate was 79 bpm, at the  start of the study  Images were obtained from the parasternal, apical, and subcostal acoustic windows  Image quality was adequate  ATRIAL SEPTUM: There was a possible, medium-sized, spherical, echogenic, mobile mass on the right side of the interatrial septum  This may represent possible liopmatous hypertrophy but cannot rule out other etiologies      [address] Accredited Echocardiography Laboratory    Prepared and electronically signed by    Gary May MD  Signed 31-Jul-2021 12:03:49    Results for orders placed during the hospital encounter of 07/29/21    EBONY    Narrative  [address] [place] [address]  [phone]    Transesophageal Echocardiogram  Limited 2D, 3D, Doppler, and Color Doppler    Study date:  02-Aug-2021    Patient: Chelsy Richey  MR number: [id number]  Account number: [id number]  : 1955  Age: 77 years  Gender: Male  Status: Inpatient  Location: Echo lab  Height: 73 in  Weight: 197 lb  BP: 126/ 63 mmHg    Indications: Cardiac mass    Diagnoses: D15 1 - Benign neoplasm of heart    Sonographer:  NASIR Corona  Primary Physician:  Beverly Gore MD  Referring Physician:  Chan Javed MD  Group:  [facility]  Cardiology Fellow: Merlin Fitch, MD  RN:  Lizett Kurtz RN  Interpreting Physician:  Chris Vee MD    SUMMARY    LEFT VENTRICLE:  Systolic function was at the lower limits of normal  Ejection fraction was estimated to be 50 %  There was hypokinesis of the basal inferior and basal-mid inferolateral wall(s)  There was no evidence of concentric hypertrophy  LEFT ATRIAL APPENDAGE:  The size was normal   The function was normal (normal emptying velocity)  No thrombus was identified  ATRIAL SEPTUM:  No defect or patent foramen ovale was identified  RIGHT ATRIUM:  Size was normal   There was no evidence of a mass  No thrombus was identified  MITRAL VALVE:  There was trace regurgitation  TRICUSPID VALVE:  There was trace regurgitation  HISTORY: PRIOR HISTORY: Hypertension; Hyperlipidemia; DM2; Atrial Fibrillation; CAD; Atrial mass; Cardiac Arrest    PROCEDURE: The procedure was performed in the echo lab  This was a routine study  The risks and alternatives of the procedure were explained to the patient and informed consent was obtained  The transesophageal approach was used  The study  included limited 2D imaging, 3D imaging, limited spectral Doppler, and color Doppler  The heart rate was 76 bpm, at the start of the study  An adult omniplane probe was inserted by the cardiology fellow under direct supervision of the  attending cardiologist  Intubated with ease  One intubation attempt(s)  There was no blood detected on the probe  Image quality was adequate  There were no complications during the procedure  MEDICATIONS: Anesthesia administered by  anesthesia team     LEFT VENTRICLE: Size was normal  Systolic function was at the lower limits of normal  Ejection fraction was estimated to be 50 %  There was hypokinesis of the basal inferior and basal-mid inferolateral wall(s)  Wall thickness was normal   There was no evidence of concentric hypertrophy  DOPPLER: The study was not technically sufficient to allow evaluation of LV diastolic function  RIGHT VENTRICLE: The size was normal  Systolic function was normal     LEFT ATRIUM: Size was normal  No thrombus was identified  APPENDAGE: The size was normal  No thrombus was identified  DOPPLER: The function was normal (normal emptying velocity)  ATRIAL SEPTUM: No defect or patent foramen ovale was identified  RIGHT ATRIUM: Size was normal  There was no evidence of a mass  No thrombus was identified  MITRAL VALVE: Valve structure was normal  There was normal leaflet separation  There was no echocardiographic evidence of vegetation  DOPPLER: There was trace regurgitation  AORTIC VALVE: The valve was trileaflet  Leaflets exhibited normal thickness and normal cuspal separation  There was no echocardiographic evidence of vegetation  DOPPLER: There was no regurgitation  TRICUSPID VALVE: The valve structure was normal  There was normal leaflet separation  There was no echocardiographic evidence of vegetation  DOPPLER: There was trace regurgitation  PULMONIC VALVE: Leaflets exhibited normal thickness, no calcification, and normal cuspal separation  There was no echocardiographic evidence of vegetation  PERICARDIUM: There was no pericardial effusion  AORTA: The root exhibited normal size  There was no atheroma  There was no evidence for dissection  There was no evidence for aneurysm      [address] Accredited Echocardiography Laboratory    Prepared and electronically signed by    Rachael Dexter MD  Signed 22-SYX-0030 15:48:23    Assessment/Plan     Assessment:  1  History of Cardiac Arrest (07/2021) In setting of Obstructive CAD s/p PCI to mLCx  And OM1  2  Paroxsymal Atrial Fibrillation in setting of post cardiac arrest   --> Resolved and no episodes seen on post-discharge Zio patch (8/26/21)  3  HTN // HLD  4  DM2     Plan:  - c/w Aspirin 81mg PO Daily  - c/w Plavix 75mg PO Daily  - c/w Lipitor 40mg PO QHS  - c/w Metoprolol Succinate 100mg PO Daily  - c/w Losartan 25mg PO Daily    Case discussed and reviewed with Dr Lucia Crockett who agrees with my assessment and plan  Thank you for involving us in the care of your patient  Wale Benitez MD  Cardiology Fellow PGY-5      ==========================================================================================    Epic/ Allscripts/Care Everywhere records reviewed:     ** Please Note: Fluency DirectDictation voice to text software may have been used in the creation of this document   **

## 2022-02-22 ENCOUNTER — OFFICE VISIT (OUTPATIENT)
Dept: CARDIOLOGY CLINIC | Facility: CLINIC | Age: 67
End: 2022-02-22
Payer: MEDICARE

## 2022-02-22 VITALS
SYSTOLIC BLOOD PRESSURE: 132 MMHG | BODY MASS INDEX: 28.43 KG/M2 | WEIGHT: 214.5 LBS | DIASTOLIC BLOOD PRESSURE: 70 MMHG | HEART RATE: 75 BPM | HEIGHT: 73 IN

## 2022-02-22 DIAGNOSIS — I48.0 PAROXYSMAL ATRIAL FIBRILLATION (HCC): Primary | ICD-10-CM

## 2022-02-22 PROCEDURE — 93000 ELECTROCARDIOGRAM COMPLETE: CPT | Performed by: INTERNAL MEDICINE

## 2022-02-22 PROCEDURE — 99215 OFFICE O/P EST HI 40 MIN: CPT | Performed by: INTERNAL MEDICINE

## 2022-05-23 DIAGNOSIS — E78.5 HYPERLIPIDEMIA, UNSPECIFIED HYPERLIPIDEMIA TYPE: ICD-10-CM

## 2022-05-23 RX ORDER — ATORVASTATIN CALCIUM 40 MG/1
40 TABLET, FILM COATED ORAL EVERY EVENING
Qty: 90 TABLET | Refills: 3 | Status: SHIPPED | OUTPATIENT
Start: 2022-05-23

## 2022-10-12 PROBLEM — H61.22 CERUMEN DEBRIS ON TYMPANIC MEMBRANE OF LEFT EAR: Status: RESOLVED | Noted: 2021-04-13 | Resolved: 2022-10-12

## 2022-11-23 ENCOUNTER — OFFICE VISIT (OUTPATIENT)
Dept: CARDIOLOGY CLINIC | Facility: CLINIC | Age: 67
End: 2022-11-23

## 2022-11-23 VITALS
OXYGEN SATURATION: 97 % | DIASTOLIC BLOOD PRESSURE: 82 MMHG | WEIGHT: 210 LBS | HEIGHT: 73 IN | HEART RATE: 75 BPM | SYSTOLIC BLOOD PRESSURE: 140 MMHG | BODY MASS INDEX: 27.83 KG/M2

## 2022-11-23 DIAGNOSIS — I25.118 CORONARY ARTERY DISEASE OF NATIVE HEART WITH STABLE ANGINA PECTORIS, UNSPECIFIED VESSEL OR LESION TYPE (HCC): ICD-10-CM

## 2022-11-23 DIAGNOSIS — Z95.5 STATUS POST INSERTION OF DRUG ELUTING CORONARY ARTERY STENT: ICD-10-CM

## 2022-11-23 DIAGNOSIS — E11.42 TYPE 2 DIABETES MELLITUS WITH PERIPHERAL NEUROPATHY (HCC): ICD-10-CM

## 2022-11-23 DIAGNOSIS — I10 ESSENTIAL HYPERTENSION: ICD-10-CM

## 2022-11-23 DIAGNOSIS — I47.1 SVT (SUPRAVENTRICULAR TACHYCARDIA) (HCC): Primary | ICD-10-CM

## 2022-11-23 DIAGNOSIS — E78.5 HYPERLIPIDEMIA, UNSPECIFIED HYPERLIPIDEMIA TYPE: ICD-10-CM

## 2022-11-23 RX ORDER — DILTIAZEM HYDROCHLORIDE 240 MG/1
CAPSULE, COATED, EXTENDED RELEASE ORAL
COMMUNITY
Start: 2022-11-09

## 2022-11-23 NOTE — PROGRESS NOTES
Cardiology Follow Up    Verdis Fleischer [facility]  1955  [id number]  [address] [id number] [phone]    1  SVT (supraventricular tachycardia) (HCC)  POCT ECG      2  Status post insertion of drug eluting coronary artery stent        3  Essential hypertension        4  Hyperlipidemia, unspecified hyperlipidemia type        5  Coronary artery disease of native heart with stable angina pectoris, unspecified vessel or lesion type ([facility] [address] )        6  Type 2 diabetes mellitus with peripheral neuropathy [facility])            Interval History:   Mr Emily Spatz presented to [place] [address] on 11/07/22 with  BPM    He was working out at the gym and noticed heart rate was increasing  On presentation emergency room he was experiencing shortness of breath diaphoresis and chest pain  EKG showed narrow complex tachycardia that converted to normal sinus rhythm after  Treatment with IV adenosine x 3  ROSIBEL and troponin elevation likely from global hypoperfusion due to SVT  Cardizem was started and up titrated to  240 mg daily  He was educated on Vagal maneuvers for SVT  Mr Emily Spatz presents to our office for a recent ED follow up visit  He is accompanied by his wife  Deepti Fermin continues going to the gym using the [employer] and walking on the treadmill withtout symptoms of PC, palpitations, lightheadedness or dizziness  He admits to dizziness related to his vertigo         Medical History   Primary Cardiologist Dr Mendoza Cancer  7/2021 sp Cardiac Arrest   7/30/21  Sp angioplasty and Resolute Prashanth IAM x 2 placed across the  99% lesion of 1st OM   Paroxysmal atrial Fibrillation in the setting of post cardiac arrest    Hypertension  Hyperlipidemia    8/26/21 Cardiac event monitor   Brief SVT no significant PVCs no significant PAC    7/30/21 LHC Prox LAD 50% stenosis, 1st diag 50% tubular stenosis  Mid % stenosis, , 1st OM 99% stenosis, lesion is comples and eccentric  Prox RCA 50% stenosis, Right PDA 50% stenosis  Sp angioplasty and Resolute Corolla IAM x 2 placed across the  99% lesion of 1st OM     5/58/15 EBONY LV systolic function lower limits of normal, LVEF 50%,   Of akinesis of the basal inferior basal mid inferior wall  No evidence of concentric hypertrophy  Left atrial appendage no thrombus identified  No defect or patent foramen ovale identified  Right atrium no thrombus  Trace MR  Trace TR    Patient Active Problem List   Diagnosis   • Sensorineural hearing loss (SNHL) of both ears   • Cardiac arrest ([facility] [address] )   • Hypertension   • Hyperlipidemia   • Type 2 diabetes mellitus with peripheral neuropathy (HCC)   • Atrial fibrillation (HCC)   • CAD (coronary artery disease)   • Atrial mass   • ST elevation myocardial infarction involving left circumflex coronary artery [facility])     Past Medical History:   Diagnosis Date   • BPPV (benign paroxysmal positional vertigo)     vestibular   • CAD (coronary artery disease) 2021   • Diabetes mellitus ([facility] [address] )    • Ear problems     fungal infections   • Hypertension      Social History     Socioeconomic History   • Marital status: /Civil Union     Spouse name: Not on file   • Number of children: Not on file   • Years of education: Not on file   • Highest education level: Not on file   Occupational History   • Not on file   Tobacco Use   • Smoking status: Former     Years: 5 00     Types: Cigarettes     Quit date: 1980     Years since quittin 3   • Smokeless tobacco: Never   Vaping Use   • Vaping Use: Never used   Substance and Sexual Activity   • Alcohol use: Not Currently   • Drug use: Never   • Sexual activity: Yes     Partners: Female   Other Topics Concern   • Not on file   Social History Narrative   • Not on file     Social Determinants of Health     Financial Resource Strain: Not on file   Food Insecurity: Not on file   Transportation Needs: Not on file Physical Activity: Not on file   Stress: Not on file   Social Connections: Not on file   Intimate Partner Violence: Not on file   Housing Stability: Not on file      Family History   Problem Relation Age of Onset   • Hearing loss Mother    • Hypertension Mother    • Hyperlipidemia Mother    • Hyperlipidemia Father    • Hypertension Father    • Diabetes Paternal Grandmother    • Hearing loss Paternal Grandfather    • Diabetes Maternal Uncle    • Diabetes Paternal Uncle      Past Surgical History:   Procedure Laterality Date   • BUNIONECTOMY Right 2006   • BUNIONECTOMY  2008       Current Outpatient Medications:   •  aspirin (ECOTRIN LOW STRENGTH) 81 mg EC tablet, Take 1 tablet (81 mg total) by mouth daily, Disp: 90 tablet, Rfl: 1  •  atorvastatin (LIPITOR) 40 mg tablet, Take 1 tablet (40 mg total) by mouth every evening, Disp: 90 tablet, Rfl: 3  •  clopidogrel (PLAVIX) 75 mg tablet, Take 1 tablet (75 mg total) by mouth daily, Disp: 90 tablet, Rfl: 1  •  DULoxetine (CYMBALTA) 60 mg delayed release capsule, daily , Disp: , Rfl:   •  loratadine (CLARITIN) 10 mg tablet, Take 10 mg by mouth daily, Disp: , Rfl:   •  losartan (COZAAR) 25 mg tablet, Take 1 tablet (25 mg total) by mouth daily, Disp: 90 tablet, Rfl: 1  •  metFORMIN (GLUCOPHAGE) 500 mg tablet, Take 500 mg by mouth 2 (two) times a day with meals, Disp: , Rfl:   •  Methylcobalamin (B12-ACTIVE PO), Take by mouth, Disp: , Rfl:   •  metoprolol succinate (TOPROL-XL) 100 mg 24 hr tablet, Take 1 tablet (100 mg total) by mouth daily, Disp: 90 tablet, Rfl: 1  •  Onglyza 5 MG tablet,  , Disp: , Rfl:   •  pantoprazole (PROTONIX) 40 mg tablet, Take 1 tablet (40 mg total) by mouth daily in the early morning, Disp: 30 tablet, Rfl: 0  •  VITAMIN D PO, Take by mouth, Disp: , Rfl:   Allergies   Allergen Reactions   • Codeine Rash       Labs:  No visits with results within 2 Month(s) from this visit     Latest known visit with results is:   No results displayed because visit has over 200 results  Imaging: No results found  Review of Systems:  Review of Systems   Neurological: Positive for dizziness  Due to vertigo   All other systems reviewed and are negative  Physical Exam:  Physical Exam  Vitals reviewed  Constitutional:       Appearance: Normal appearance  Cardiovascular:      Rate and Rhythm: Normal rate and regular rhythm  Pulses: Normal pulses  Heart sounds: Normal heart sounds  Pulmonary:      Effort: Pulmonary effort is normal       Breath sounds: Normal breath sounds  Abdominal:      General: Abdomen is flat  Palpations: Abdomen is soft  Musculoskeletal:         General: Normal range of motion  Cervical back: Normal range of motion and neck supple  Right lower leg: No edema  Left lower leg: No edema  Skin:     General: Skin is warm and dry  Capillary Refill: Capillary refill takes less than 2 seconds  Neurological:      General: No focal deficit present  Mental Status: He is alert and oriented to person, place, and time  Psychiatric:         Mood and Affect: Mood normal          Behavior: Behavior normal          Discussion/Summary:  1  SVT - continue on  Metoprolol succinate 100 mg daily Cardizem CD 2 40 mg daily  He will be seen Dr Summer Terrazas on 12/13/22 for SVT ablation  Advised not to over exert at the [facility]  Avoiding ETOH and consumes one cup of coffee a day  2  Hypertension RUE sitting 130/70 continue on  Metoprolol succinate 100 mg daily, Cardizem CD 2 40 mg daily, 2gm sodium diet   3  Hyperlipidemia 5/31/22 , , HDL 45  Continue Lipitor 40 daily, DASH diet   4  7/30/21  Sp angioplasty and Resolute Poyntelle IAM x 2 placed across the  99% lesion of 1st OM,  Continue on ASA 81 mg daily,  Plavix 75 mg daily, Lipitor 40 daily,  Metoprolol succinate 100 mg daily, Cardizem CD 2 40 mg daily, DASH diet   5   CAD: 7/30/21 LHC: Prox LAD 50% stenosis, 1st diag 50% tubular stenosis  Mid % stenosis, , 1st OM 99% stenosis, lesion is comples and eccentric  Prox RCA 50% stenosis, Right PDA 50% stenosis Continue on ASA 81 mg daily,  Plavix 75 mg daily, Lipitor 40 daily,  Metoprolol succinate 100 mg daily, Cardizem CD 2 40 mg daily, DASH die  6  DM2 HgbA1C 7 8 on 11/07/22  Continue on metformin 500 mg b i d

## 2023-02-08 NOTE — PROGRESS NOTES
Outpatient Progress Note - General Cardiology   Catherine Prakash 69 y o  male   MRN: [id number]  Encounter: [id number]    Interval History:  Patient was seen at [address] on 11/7/22 with SVT up to 170 bpm      He was working out at the gym and noticed heart rate was increasing  On presentation emergency room he was experiencing shortness of breath diaphoresis and chest pain  EKG showed narrow complex tachycardia that converted to normal sinus rhythm after treatment with IV adenosine x 3  Cardizem was started and up titrated to 240 mg daily  He was educated on Vagal maneuvers for SVT  Patient saw Frances Thorne in the office on 11/23/22 where he was advised to continue Metoprolol Succ  and Cardizem  He saw Dr Kelly Bland at [address] [place] [address] where there was discussion about SVT ablation vs  continued medical therapy  Patient opted for the latter  States since being on CCB + BB he has felt great and has no physical limitations  He had resumed exercising and has not had any further SVT episodes  He will be seeing Dr Kelly Bland in 3 months for a follow up visit  Cardiac History: Catherine Prakash is a 69y o  year old male  with a history of HTN, HLD, DM2 (A1c 7 8%)  Patient was seen by me in the ED on 7/29/21 where he presented with a cardiac arrest episode  Patient stated that he was at the gym where he did a 1 5 hour work out and towards the end while doing a cool down on the rowing machine he suddenly felt lightheaded and dizzy  He subsequently passed out before bystanders arrived  He underwent 4 rounds of CPR and an AED was placed which advised shock x1  Patient arrived to the ED and EKG was notable for Atrial Fibrillation (patient denied a history of this prior to present episode)  He underwent LHC and is s/p PCI of mLCx  And OM1     2/22/22:  Patient has no complaints since last visit  Has resumed regular activity without limitations  States he is in great spirits and doing exceptionally well       Review of Systems  Review of system was conducted and was negative except for as stated in the HPI        Historical Information   Past Medical History:   Diagnosis Date   • BPPV (benign paroxysmal positional vertigo)     vestibular   • CAD (coronary artery disease) 2021   • Diabetes mellitus ([facility] [address] )    • Ear problems     fungal infections   • Hypertension      Past Surgical History:   Procedure Laterality Date   • BUNIONECTOMY Right 2006   • BUNIONECTOMY  2008     Social History     Substance and Sexual Activity   Alcohol Use Not Currently     Social History     Substance and Sexual Activity   Drug Use Never     Social History     Tobacco Use   Smoking Status Former   • Years: 5 00   • Types: Cigarettes   • Quit date: 1980   • Years since quittin 5   Smokeless Tobacco Never     Family History:   Family History   Problem Relation Age of Onset   • Hearing loss Mother    • Hypertension Mother    • Hyperlipidemia Mother    • Hyperlipidemia Father    • Hypertension Father    • Diabetes Paternal Grandmother    • Hearing loss Paternal Grandfather    • Diabetes Maternal Uncle    • Diabetes Paternal Uncle        Meds/Allergies   Home Medications:   Current Outpatient Medications:   •  aspirin (ECOTRIN LOW STRENGTH) 81 mg EC tablet, Take 1 tablet (81 mg total) by mouth daily, Disp: 90 tablet, Rfl: 1  •  atorvastatin (LIPITOR) 40 mg tablet, Take 1 tablet (40 mg total) by mouth every evening, Disp: 90 tablet, Rfl: 3  •  clopidogrel (PLAVIX) 75 mg tablet, Take 1 tablet (75 mg total) by mouth daily, Disp: 90 tablet, Rfl: 1  •  diltiazem (CARDIZEM CD) 240 mg 24 hr capsule, , Disp: , Rfl:   •  DULoxetine (CYMBALTA) 60 mg delayed release capsule, daily , Disp: , Rfl:   •  loratadine (CLARITIN) 10 mg tablet, Take 10 mg by mouth daily, Disp: , Rfl:   •  metFORMIN (GLUCOPHAGE) 500 mg tablet, Take 500 mg by mouth 2 (two) times a day with meals, Disp: , Rfl:   •  Methylcobalamin (B12-ACTIVE PO), Take by mouth (Patient not taking: Reported on 2022), Disp: , Rfl:   •  metoprolol succinate (TOPROL-XL) 100 mg 24 hr tablet, Take 1 tablet (100 mg total) by mouth daily, Disp: 90 tablet, Rfl: 1  •  Onglyza 5 MG tablet,  , Disp: , Rfl:   •  pantoprazole (PROTONIX) 40 mg tablet, Take 1 tablet (40 mg total) by mouth daily in the early morning, Disp: 30 tablet, Rfl: 0  •  VITAMIN D PO, Take by mouth, Disp: , Rfl:     Allergies   Allergen Reactions   • Codeine Rash         Objective   Vitals: There were no vitals taken for this visit        Physical Exam  GEN: Lin Patino appears well, alert and oriented x 3, pleasant and cooperative   HEENT:  Normocephalic, atraumatic, anicteric, moist mucous membranes  NECK: No JVD or carotid bruits   HEART: reg rhythm, reg rate, normal S1 and S2, no murmurs, clicks, gallops or rubs   LUNGS: Clear to auscultation bilaterally; no wheezes, rales, or rhonchi; respiration nonlabored   ABDOMEN:  Normoactive bowel sounds, soft, no tenderness, no distention  EXTREMITIES: peripheral pulses palpable; no edema  NEURO: no gross focal findings; cranial nerves grossly intact   SKIN:  Dry, intact, warm to touch    Lab Results:   CBC with diff:     CMP:       Invalid input(s): ALBUMIN  Troponin:   0   Lab Value Date/Time    TROPONINI 0 09 (H) 07/30/2021 0046    TROPONINI 0 09 (H) 07/29/2021 2123    TROPONINI 0 05 (H) 07/29/2021 1829    TROPONINI <0 02 07/29/2021 1513     BNP:     Coags:     TSH:     Magnesium:     Lipid Profile:     Lab Results   Component Value Date    TRIG 410 (H) 07/29/2021    HDL 36 (L) 07/29/2021    LDLCALC  07/29/2021      Comment:      Calculated LDL invalid, triglycerides >400 mg/dl     Lab Results   Component Value Date    K 3 8 08/02/2021    CO2 24 08/02/2021     08/02/2021    BUN 19 08/02/2021    CREATININE 1 27 08/02/2021    ALT 77 07/29/2021    AST 55 (H) 07/29/2021     Lab Results   Component Value Date    WBC 9 25 08/02/2021    HGB 13 5 08/02/2021    HCT 41 3 08/02/2021    MCV 87 08/02/2021     08/02/2021 Lab Results   Component Value Date    INR 0 95 2021         Imaging: I have personally reviewed pertinent reports  Previous STRESS TEST:  No results found for this or any previous visit  No results found for this or any previous visit  No results found for this or any previous visit  Previous Cath/PCI:  No results found for this or any previous visit  ECHO:  Results for orders placed during the hospital encounter of 21    Echo complete with contrast if indicated    Narrative  [facility] [address]  [facility], [address] [facility]  [phone]    Transthoracic Echocardiogram  Limited 2D, Doppler, and Color Doppler    Study date:  2021    Patient: Tyson Rodriguez  MR number: [id number]  Account number: [de-identified]  : 1955  Age: 77 years  Gender: Male  Status: Inpatient  Location: Bedside  Height: 73 in  Weight: 193 lb  BP: 117/ 70 mmHg    Indications: Suspected Interatrial Thrombus    Diagnoses: I23 6 - Thrombosis of atrium, auricular appendage, and ventricle as current complications following acute my    Sonographer:  Akshat Maria RDCS  Primary Physician:  Julio Velez MD  Referring Physician:  Akshat Maria MD  Group:  [facility]  Cardiology Fellow: Lm Kwok MD  Interpreting Physician:  Akshat Maria MD    SUMMARY    ATRIAL SEPTUM:  There was a possible, medium-sized, spherical, echogenic, mobile mass on the right side of the interatrial septum  This may represent possible liopmatous hypertrophy but cannot rule out other etiologies  RECOMMENDATIONS:  If clinically indicated, transesophageal echocardiography could provide additional information  HISTORY: PRIOR HISTORY: DM2, HTN, HLD, Cardiac Arrest    PROCEDURE: The procedure was performed at the bedside  This was a routine study  The transthoracic approach was used   The study included limited 2D imaging, limited spectral Doppler, and color Doppler  The heart rate was 79 bpm, at the  start of the study  Images were obtained from the parasternal, apical, and subcostal acoustic windows  Image quality was adequate  ATRIAL SEPTUM: There was a possible, medium-sized, spherical, echogenic, mobile mass on the right side of the interatrial septum  This may represent possible liopmatous hypertrophy but cannot rule out other etiologies  [address] Accredited Echocardiography Laboratory    Prepared and electronically signed by    Emily Berrios MD  Signed 2021 12:03:49    Results for orders placed during the hospital encounter of 21    EBONY    Narrative  [address]  [facility], [address] [facility]  [phone]    Transesophageal Echocardiogram  Limited 2D, 3D, Doppler, and Color Doppler    Study date:  02-Aug-2021    Patient: Curtistine Fleischer  MR number: [id number]  Account number: [de-identified]  : 1955  Age: 77 years  Gender: Male  Status: Inpatient  Location: Echo lab  Height: 73 in  Weight: 197 lb  BP: 126/ 63 mmHg    Indications: Cardiac mass    Diagnoses: D15 1 - Benign neoplasm of heart    Sonographer:  NASIR Pete  Primary Physician:  Nathaly Martinez MD  Referring Physician:  Emily Berrios MD  Group:  [place] [address]  Cardiology Fellow: Yumiko Galicia MD  RN:  Hemant Ling RN  Interpreting Physician:  Sherif Leiva MD    SUMMARY    LEFT VENTRICLE:  Systolic function was at the lower limits of normal  Ejection fraction was estimated to be 50 %  There was hypokinesis of the basal inferior and basal-mid inferolateral wall(s)  There was no evidence of concentric hypertrophy  LEFT ATRIAL APPENDAGE:  The size was normal   The function was normal (normal emptying velocity)  No thrombus was identified  ATRIAL SEPTUM:  No defect or patent foramen ovale was identified  RIGHT ATRIUM:  Size was normal   There was no evidence of a mass    No thrombus was identified  MITRAL VALVE:  There was trace regurgitation  TRICUSPID VALVE:  There was trace regurgitation  HISTORY: PRIOR HISTORY: Hypertension; Hyperlipidemia; DM2; Atrial Fibrillation; CAD; Atrial mass; Cardiac Arrest    PROCEDURE: The procedure was performed in the echo lab  This was a routine study  The risks and alternatives of the procedure were explained to the patient and informed consent was obtained  The transesophageal approach was used  The study  included limited 2D imaging, 3D imaging, limited spectral Doppler, and color Doppler  The heart rate was 76 bpm, at the start of the study  An adult omniplane probe was inserted by the cardiology fellow under direct supervision of the  attending cardiologist  Intubated with ease  One intubation attempt(s)  There was no blood detected on the probe  Image quality was adequate  There were no complications during the procedure  MEDICATIONS: Anesthesia administered by  anesthesia team     LEFT VENTRICLE: Size was normal  Systolic function was at the lower limits of normal  Ejection fraction was estimated to be 50 %  There was hypokinesis of the basal inferior and basal-mid inferolateral wall(s)  Wall thickness was normal   There was no evidence of concentric hypertrophy  DOPPLER: The study was not technically sufficient to allow evaluation of LV diastolic function  RIGHT VENTRICLE: The size was normal  Systolic function was normal     LEFT ATRIUM: Size was normal  No thrombus was identified  APPENDAGE: The size was normal  No thrombus was identified  DOPPLER: The function was normal (normal emptying velocity)  ATRIAL SEPTUM: No defect or patent foramen ovale was identified  RIGHT ATRIUM: Size was normal  There was no evidence of a mass  No thrombus was identified  MITRAL VALVE: Valve structure was normal  There was normal leaflet separation  There was no echocardiographic evidence of vegetation   DOPPLER: There was trace regurgitation  AORTIC VALVE: The valve was trileaflet  Leaflets exhibited normal thickness and normal cuspal separation  There was no echocardiographic evidence of vegetation  DOPPLER: There was no regurgitation  TRICUSPID VALVE: The valve structure was normal  There was normal leaflet separation  There was no echocardiographic evidence of vegetation  DOPPLER: There was trace regurgitation  PULMONIC VALVE: Leaflets exhibited normal thickness, no calcification, and normal cuspal separation  There was no echocardiographic evidence of vegetation  PERICARDIUM: There was no pericardial effusion  AORTA: The root exhibited normal size  There was no atheroma  There was no evidence for dissection  There was no evidence for aneurysm  [address] Accredited Echocardiography Laboratory    Prepared and electronically signed by    David Méndez MD  Signed 02-Aug-2021 15:48:23    Assessment/Plan     Assessment:  1  Supraventricular Tachycardia   2  History of Cardiac Arrest (07/2021) In setting of Obstructive CAD s/p PCI to mLCx  And OM1  3  Paroxsymal Atrial Fibrillation in setting of post cardiac arrest   --> Resolved and no episodes seen on post-discharge Zio patch (8/26/21)  4  HTN // HLD  5  DM2     Plan:  - c/w Aspirin 81mg PO Daily indefinitely  - STOP Plavix as it has been over 1 year since his ACS  - c/w Lipitor 40mg PO QHS  - c/w Cardizem 240mg PO Daily  - c/w Metoprolol Succinate 100mg PO Daily  --> I did inform Mr Michael Cowan that the combination of CCB + BB can cause profound bradycardia and he may become symptomatic from it   States he has been fairly asymptomatic on this combination and will continue to monitor symptoms and his HR   --> I will order a Zio Patch to make sure he is not have nocturnal bradycardia or pauses  - Advised to continue his usual level of activity and if SVT were to occur in this setting he would likely be best served with an EPS and ablation so he can maintain his physical activity level  - Patient will follow up with Dr Nahid Tan (EP) at [facility] in 3 months    Case discussed and reviewed with Dr Sridevi Viramontes who agrees with my assessment and plan  Thank you for involving us in the care of your patient  Sorin Billy  Cardiology Fellow PGY-6      ==========================================================================================    Epic/ Allscripts/Care Everywhere records reviewed:     ** Please Note: Fluency DirectDictation voice to text software may have been used in the creation of this document   **

## 2023-02-14 ENCOUNTER — OFFICE VISIT (OUTPATIENT)
Dept: CARDIOLOGY CLINIC | Facility: CLINIC | Age: 68
End: 2023-02-14

## 2023-02-14 VITALS
SYSTOLIC BLOOD PRESSURE: 144 MMHG | WEIGHT: 212.8 LBS | OXYGEN SATURATION: 95 % | HEART RATE: 66 BPM | HEIGHT: 73 IN | BODY MASS INDEX: 28.2 KG/M2 | DIASTOLIC BLOOD PRESSURE: 72 MMHG

## 2023-02-14 DIAGNOSIS — I45.5 SINUS PAUSE: Primary | ICD-10-CM

## 2023-02-14 DIAGNOSIS — I25.118 CORONARY ARTERY DISEASE OF NATIVE HEART WITH STABLE ANGINA PECTORIS, UNSPECIFIED VESSEL OR LESION TYPE (HCC): ICD-10-CM

## 2023-02-14 DIAGNOSIS — I48.91 ATRIAL FIBRILLATION, UNSPECIFIED TYPE (HCC): ICD-10-CM

## 2023-02-14 DIAGNOSIS — E11.42 TYPE 2 DIABETES MELLITUS WITH PERIPHERAL NEUROPATHY (HCC): ICD-10-CM

## 2023-02-14 DIAGNOSIS — Z09 FOLLOW-UP EXAM: ICD-10-CM

## 2023-02-14 DIAGNOSIS — N18.30 STAGE 3 CHRONIC KIDNEY DISEASE, UNSPECIFIED WHETHER STAGE 3A OR 3B CKD (HCC): ICD-10-CM

## 2023-02-27 ENCOUNTER — TELEPHONE (OUTPATIENT)
Dept: CARDIOLOGY CLINIC | Facility: CLINIC | Age: 68
End: 2023-02-27

## 2023-03-20 ENCOUNTER — CLINICAL SUPPORT (OUTPATIENT)
Dept: CARDIOLOGY CLINIC | Facility: CLINIC | Age: 68
End: 2023-03-20

## 2023-03-20 DIAGNOSIS — I48.91 ATRIAL FIBRILLATION, UNSPECIFIED TYPE (HCC): ICD-10-CM

## 2023-03-20 DIAGNOSIS — I45.5 SINUS PAUSE: ICD-10-CM

## 2023-03-27 ENCOUNTER — TELEPHONE (OUTPATIENT)
Dept: CARDIOLOGY CLINIC | Facility: CLINIC | Age: 68
End: 2023-03-27